# Patient Record
Sex: FEMALE | Race: WHITE | Employment: FULL TIME | URBAN - METROPOLITAN AREA
[De-identification: names, ages, dates, MRNs, and addresses within clinical notes are randomized per-mention and may not be internally consistent; named-entity substitution may affect disease eponyms.]

---

## 2019-02-11 LAB
ALT SERPL-CCNC: 19 IU/L (ref 8–45)
AST SERPL-CCNC: 19 IU/L (ref 2–40)
CHOLEST SERPL-MCNC: 266 MG/DL (ref 100–199)
CREAT SERPL-MCNC: 0.8 MG/DL (ref 0.57–1.11)
GFR SERPL CREATININE-BSD FRML MDRD: >60 ML/MIN/1.73M2
GLUCOSE SERPL-MCNC: 183 MG/DL (ref 65–100)
HBA1C MFR BLD: 7.6 % (ref 0–5.7)
HDLC SERPL-MCNC: 46 MG/DL
LDLC SERPL CALC-MCNC: 178 MG/DL
NONHDLC SERPL-MCNC: 220 MG/DL
POTASSIUM SERPL-SCNC: 3.8 MMOL/L (ref 3.5–5)
TRIGL SERPL-MCNC: 208 MG/DL

## 2019-03-21 ENCOUNTER — TRANSFERRED RECORDS (OUTPATIENT)
Dept: HEALTH INFORMATION MANAGEMENT | Facility: CLINIC | Age: 64
End: 2019-03-21

## 2019-05-28 ENCOUNTER — TRANSFERRED RECORDS (OUTPATIENT)
Dept: HEALTH INFORMATION MANAGEMENT | Facility: CLINIC | Age: 64
End: 2019-05-28

## 2019-05-29 LAB
CREAT SERPL-MCNC: 1.33 MG/DL (ref 0.57–1.11)
GFR SERPL CREATININE-BSD FRML MDRD: 40 ML/MIN/1.73M2
GLUCOSE SERPL-MCNC: 167 MG/DL (ref 65–100)
POTASSIUM SERPL-SCNC: 4.2 MMOL/L (ref 3.5–5)

## 2019-06-04 ENCOUNTER — TRANSFERRED RECORDS (OUTPATIENT)
Dept: HEALTH INFORMATION MANAGEMENT | Facility: CLINIC | Age: 64
End: 2019-06-04

## 2019-06-04 LAB
ALT SERPL-CCNC: 8 IU/L (ref 8–45)
AST SERPL-CCNC: 16 IU/L (ref 2–40)
CREAT SERPL-MCNC: 1.16 MG/DL (ref 0.57–1.11)
GFR SERPL CREATININE-BSD FRML MDRD: 47 ML/MIN/1.73M2
GLUCOSE SERPL-MCNC: 179 MG/DL (ref 65–100)
POTASSIUM SERPL-SCNC: 4.7 MMOL/L (ref 3.5–5)

## 2019-07-15 NOTE — PROGRESS NOTES
Subjective     Sheeba Morris is a 63 year old female who presents to clinic today for the following health issues:    HPI     PAP per pt had a hysterectomy.  Pap no longer indicated.    NEW PATIENT changed insurances:    Tried 5 statins and got really bad aches. Added to allergy list.     Recent kidney cancer--following Dr. Walker urologist currently.      Surgically removed.  No chemo or radiation done.  Gets MRI every 6 months.      Patient is morbidly obese.  Has seen dietician.         Last a1c was 7.6. In February.  Had kidney function done last month and she has mild kidney disease stage 2.  She did also just have a kidney removed.       Diabetes Follow-up      How often are you checking your blood sugar? One time daily    What time of day are you checking your blood sugars (select all that apply)?  Before meals    Have you had any blood sugars above 200?  No    Have you had any blood sugars below 70?  Yes 50    What symptoms do you notice when your blood sugar is low?  Shaky, Dizzy, Weak, Lethargy and Confusion    What concerns do you have today about your diabetes? Other: Go over meds      Do you have any of these symptoms? (Select all that apply)  No numbness or tingling in feet.  No redness, sores or blisters on feet.  No complaints of excessive thirst.  No reports of blurry vision.  No significant changes to weight.     Have you had a diabetic eye exam in the last 12 months? Yes- Date of last eye exam: 04/01/2019    Diabetes Management Resources    Patient has been taking insulin, metformin, and a sulfonylurea.  No side effects.  Her sugars have been generally in the 100s.  She declines an A1c today because she states she does not think her insurance will cover it and she is used to getting them every 6 months.  She will return next month for this.          Hypertension Follow-up      Do you check your blood pressure regularly outside of the clinic? Yes     Are you following a low salt diet? Yes    Are  your blood pressures ever more than 140 on the top number (systolic) OR more   than 90 on the bottom number (diastolic), for example 140/90? No      No chest pain, shortness of breath, edema, PND, or orthopnea. No dizziness or vision changes. No side effects from medications. Blood pressure has been stable on medication.    BP Readings from Last 2 Encounters:   No data found for BP     Hemoglobin A1C (%)   Date Value   02/11/2019 7.6 (H)     LDL Cholesterol Calculated (mg/dL)   Date Value   02/11/2019 178 (H)       Amount of exercise or physical activity: None    Problems taking medications regularly: No    Medication side effects: none    Diet: low salt and low fat/cholesterol            Nonsmoker.     Previously followed with endocrine for diabetes then family practice. Wants to establish here.  Her friend recommended she see me.    States she had blood in her urine last week.  This resolved but she would like a UA done today.  If it is positive she will need to follow-up again with her urologist.  Patient Active Problem List   Diagnosis     Advanced directives, counseling/discussion     Morbid obesity (H)     History of kidney cancer     Type 2 diabetes mellitus without complication, with long-term current use of insulin (H)     Benign essential hypertension     Past Surgical History:   Procedure Laterality Date     BREAST SURGERY      cancer     HYSTERECTOMY      cancer     HYSTERECTOMY TOTAL ABDOMINAL       KIDNEY SURGERY      cancer       Social History     Tobacco Use     Smoking status: Never Smoker     Smokeless tobacco: Never Used   Substance Use Topics     Alcohol use: Not Currently     Frequency: Never     Family History   Problem Relation Age of Onset     Hypertension Mother      Diabetes Mother      Hyperlipidemia Mother      Hypertension Father      Diabetes Father      Hyperlipidemia Father          Current Outpatient Medications   Medication Sig Dispense Refill     diltiazem ER COATED BEADS  "(CARDIZEM CD/CARTIA XT) 180 MG 24 hr capsule Take 1 capsule (180 mg) by mouth daily 90 capsule 0     glimepiride (AMARYL) 4 MG tablet Take 1 tablet (4 mg) by mouth 2 times daily 180 tablet 0     insulin glargine (BASAGLAR KWIKPEN) 100 UNIT/ML pen Inject 45 Units Subcutaneous At Bedtime 3 months supply please 41 mL 0     metFORMIN (GLUCOPHAGE-XR) 500 MG 24 hr tablet Take 2 tablets (1,000 mg) by mouth 2 times daily (with meals) 360 tablet 0     STATIN NOT PRESCRIBED (INTENTIONAL) Please choose reason not prescribed, below       valsartan-hydrochlorothiazide (DIOVAN HCT) 160-25 MG tablet Take 1 tablet by mouth daily 90 tablet 0     Allergies   Allergen Reactions     Hmg-Coa-R Inhibitors Muscle Pain (Myalgia)     Severe muscle pain and aches     Penicillins      12-            Dermat.-Resp. problems-         Reviewed and updated as needed this visit by Provider         Review of Systems   ROS COMP: Constitutional, HEENT, cardiovascular, pulmonary, GI, , musculoskeletal, neuro, skin, endocrine and psych systems are negative, except as otherwise noted.      Objective    Pulse 94   Temp 98.8  F (37.1  C) (Oral)   Resp 16   Ht 1.613 m (5' 3.5\")   Wt 130.2 kg (287 lb)   LMP  (Exact Date)   SpO2 95%   Breastfeeding? No   BMI 50.04 kg/m    Body mass index is 50.04 kg/m .  Physical Exam   GENERAL: alert, no distress and obese  RESP: lungs clear to auscultation - no rales, rhonchi or wheezes  CV: regular rate and rhythm, normal S1 S2, no S3 or S4, no murmur, click or rub, no peripheral edema and peripheral pulses strong  MS: no gross musculoskeletal defects noted, no edema  NEURO: Normal strength and tone, mentation intact and speech normal  PSYCH: mentation appears normal, affect normal/bright    ua-in progress        Assessment & Plan     1. Type 2 diabetes mellitus without complication, with long-term current use of insulin (H)  Need labs see below    - diltiazem ER COATED BEADS (CARDIZEM CD/CARTIA XT) 180 MG " "24 hr capsule; Take 1 capsule (180 mg) by mouth daily  Dispense: 90 capsule; Refill: 0  - glimepiride (AMARYL) 4 MG tablet; Take 1 tablet (4 mg) by mouth 2 times daily  Dispense: 180 tablet; Refill: 0  - insulin glargine (BASAGLAR KWIKPEN) 100 UNIT/ML pen; Inject 45 Units Subcutaneous At Bedtime 3 months supply please  Dispense: 41 mL; Refill: 0  - metFORMIN (GLUCOPHAGE-XR) 500 MG 24 hr tablet; Take 2 tablets (1,000 mg) by mouth 2 times daily (with meals)  Dispense: 360 tablet; Refill: 0  - valsartan-hydrochlorothiazide (DIOVAN HCT) 160-25 MG tablet; Take 1 tablet by mouth daily  Dispense: 90 tablet; Refill: 0  - Hemoglobin A1c; Future  - Basic metabolic panel; Future  - STATIN NOT PRESCRIBED (INTENTIONAL); Please choose reason not prescribed, below    2. Screen for colon cancer    Not addressed today  3. Morbid obesity (H)    Needs to work on eating habits  4. Screening for hyperlipidemia      5. Advanced directives, counseling/discussion      6. History of kidney cancer  Continue with urology    7. Benign essential hypertension      8. Hematuria, unspecified type  Waiting on labs    - UA reflex to Microscopic     BMI:   Estimated body mass index is 50.04 kg/m  as calculated from the following:    Height as of this encounter: 1.613 m (5' 3.5\").    Weight as of this encounter: 130.2 kg (287 lb).   Weight management plan: Discussed healthy diet and exercise guidelines      Recheck 1 month after a1c is done      Return in about 4 weeks (around 8/27/2019) for Lab Work.    Maria Guadalupe Martin PA-C  [facility]        "

## 2019-07-30 ENCOUNTER — OFFICE VISIT (OUTPATIENT)
Dept: FAMILY MEDICINE | Facility: CLINIC | Age: 64
End: 2019-07-30
Payer: COMMERCIAL

## 2019-07-30 VITALS
BODY MASS INDEX: 49 KG/M2 | HEART RATE: 94 BPM | TEMPERATURE: 98.8 F | WEIGHT: 287 LBS | SYSTOLIC BLOOD PRESSURE: 130 MMHG | DIASTOLIC BLOOD PRESSURE: 70 MMHG | RESPIRATION RATE: 16 BRPM | HEIGHT: 64 IN | OXYGEN SATURATION: 95 %

## 2019-07-30 DIAGNOSIS — E66.01 MORBID OBESITY (H): ICD-10-CM

## 2019-07-30 DIAGNOSIS — Z85.528 HISTORY OF KIDNEY CANCER: ICD-10-CM

## 2019-07-30 DIAGNOSIS — Z12.11 SCREEN FOR COLON CANCER: ICD-10-CM

## 2019-07-30 DIAGNOSIS — Z79.4 TYPE 2 DIABETES MELLITUS WITHOUT COMPLICATION, WITH LONG-TERM CURRENT USE OF INSULIN (H): Primary | ICD-10-CM

## 2019-07-30 DIAGNOSIS — R31.9 HEMATURIA, UNSPECIFIED TYPE: ICD-10-CM

## 2019-07-30 DIAGNOSIS — Z13.220 SCREENING FOR HYPERLIPIDEMIA: ICD-10-CM

## 2019-07-30 DIAGNOSIS — E11.9 TYPE 2 DIABETES MELLITUS WITHOUT COMPLICATION, WITH LONG-TERM CURRENT USE OF INSULIN (H): Primary | ICD-10-CM

## 2019-07-30 DIAGNOSIS — I10 BENIGN ESSENTIAL HYPERTENSION: ICD-10-CM

## 2019-07-30 DIAGNOSIS — Z71.89 ADVANCED DIRECTIVES, COUNSELING/DISCUSSION: ICD-10-CM

## 2019-07-30 LAB
ALBUMIN UR-MCNC: NEGATIVE MG/DL
APPEARANCE UR: CLEAR
BACTERIA #/AREA URNS HPF: ABNORMAL /HPF
BILIRUB UR QL STRIP: NEGATIVE
COLOR UR AUTO: YELLOW
GLUCOSE UR STRIP-MCNC: NEGATIVE MG/DL
HGB UR QL STRIP: ABNORMAL
KETONES UR STRIP-MCNC: NEGATIVE MG/DL
LEUKOCYTE ESTERASE UR QL STRIP: NEGATIVE
NITRATE UR QL: NEGATIVE
NON-SQ EPI CELLS #/AREA URNS LPF: ABNORMAL /LPF
PH UR STRIP: 5 PH (ref 5–7)
RBC #/AREA URNS AUTO: ABNORMAL /HPF
SOURCE: ABNORMAL
SP GR UR STRIP: >1.03 (ref 1–1.03)
UROBILINOGEN UR STRIP-ACNC: 0.2 EU/DL (ref 0.2–1)
WBC #/AREA URNS AUTO: ABNORMAL /HPF

## 2019-07-30 PROCEDURE — 87086 URINE CULTURE/COLONY COUNT: CPT | Performed by: PHYSICIAN ASSISTANT

## 2019-07-30 PROCEDURE — 99214 OFFICE O/P EST MOD 30 MIN: CPT | Performed by: PHYSICIAN ASSISTANT

## 2019-07-30 PROCEDURE — 81001 URINALYSIS AUTO W/SCOPE: CPT | Performed by: PHYSICIAN ASSISTANT

## 2019-07-30 PROCEDURE — 87186 SC STD MICRODIL/AGAR DIL: CPT | Performed by: PHYSICIAN ASSISTANT

## 2019-07-30 PROCEDURE — 87088 URINE BACTERIA CULTURE: CPT | Performed by: PHYSICIAN ASSISTANT

## 2019-07-30 RX ORDER — GLIMEPIRIDE 4 MG/1
4 TABLET ORAL 2 TIMES DAILY
Qty: 180 TABLET | Refills: 0 | Status: SHIPPED | OUTPATIENT
Start: 2019-07-30 | End: 2019-08-27 | Stop reason: ALTCHOICE

## 2019-07-30 RX ORDER — INSULIN GLARGINE 100 [IU]/ML
45 INJECTION, SOLUTION SUBCUTANEOUS
COMMUNITY
Start: 2019-02-06 | End: 2019-07-30

## 2019-07-30 RX ORDER — DILTIAZEM HYDROCHLORIDE 180 MG/1
180 CAPSULE, COATED, EXTENDED RELEASE ORAL DAILY
Qty: 90 CAPSULE | Refills: 0 | Status: SHIPPED | OUTPATIENT
Start: 2019-07-30 | End: 2019-10-21

## 2019-07-30 RX ORDER — VALSARTAN AND HYDROCHLOROTHIAZIDE 160; 25 MG/1; MG/1
1 TABLET ORAL DAILY
Qty: 90 TABLET | Refills: 0 | Status: SHIPPED | OUTPATIENT
Start: 2019-07-30 | End: 2019-12-05

## 2019-07-30 RX ORDER — DILTIAZEM HYDROCHLORIDE 180 MG/1
180 CAPSULE, COATED, EXTENDED RELEASE ORAL
COMMUNITY
Start: 2019-04-17 | End: 2019-07-30

## 2019-07-30 RX ORDER — METFORMIN HCL 500 MG
1000 TABLET, EXTENDED RELEASE 24 HR ORAL
COMMUNITY
Start: 2019-07-14 | End: 2019-07-30

## 2019-07-30 RX ORDER — GLIMEPIRIDE 4 MG/1
TABLET ORAL
COMMUNITY
Start: 2019-07-17 | End: 2019-07-30

## 2019-07-30 RX ORDER — VALSARTAN AND HYDROCHLOROTHIAZIDE 160; 25 MG/1; MG/1
1 TABLET ORAL DAILY
Refills: 3 | COMMUNITY
Start: 2019-05-20 | End: 2019-07-30

## 2019-07-30 RX ORDER — METFORMIN HCL 500 MG
1000 TABLET, EXTENDED RELEASE 24 HR ORAL 2 TIMES DAILY WITH MEALS
Qty: 360 TABLET | Refills: 0 | Status: SHIPPED | OUTPATIENT
Start: 2019-07-30 | End: 2019-12-09

## 2019-07-30 RX ORDER — INSULIN GLARGINE 100 [IU]/ML
45 INJECTION, SOLUTION SUBCUTANEOUS AT BEDTIME
Qty: 41 ML | Refills: 0 | Status: SHIPPED | OUTPATIENT
Start: 2019-07-30 | End: 2019-09-23

## 2019-07-30 SDOH — HEALTH STABILITY: MENTAL HEALTH: HOW OFTEN DO YOU HAVE A DRINK CONTAINING ALCOHOL?: NEVER

## 2019-07-30 ASSESSMENT — MIFFLIN-ST. JEOR: SCORE: 1833.88

## 2019-07-31 ENCOUNTER — TELEPHONE (OUTPATIENT)
Dept: FAMILY MEDICINE | Facility: CLINIC | Age: 64
End: 2019-07-31

## 2019-07-31 RX ORDER — CIPROFLOXACIN 500 MG/1
500 TABLET, FILM COATED ORAL 2 TIMES DAILY
Qty: 14 TABLET | Refills: 0 | Status: SHIPPED | OUTPATIENT
Start: 2019-07-31 | End: 2019-10-21

## 2019-07-31 NOTE — RESULT ENCOUNTER NOTE
PLEASE CALL PATIENT: Dear Sheeba,      It was a pleasure to see you at your recent office visit.  Your test results are listed below.  Urine is positive culture growing e coli. Please start antibiotic sent to pharmacy for ou.       If you have any questions or concerns, please call the clinic at [phone].    Sincerely,  Maria Guadalupe Martin PA-C

## 2019-07-31 NOTE — TELEPHONE ENCOUNTER
PLEASE CALL PATIENT: Dear Sheeba,       It was a pleasure to see you at your recent office visit.  Your test results are listed below.  Urine is positive culture growing e coli. Please start antibiotic sent to pharmacy for ou.       If you have any questions or concerns, please call the clinic at [phone].     Sincerely,   Maria Guadalupe Martin PA-C

## 2019-07-31 NOTE — RESULT ENCOUNTER NOTE
PLEASE CALL PATIENT: Dear Sheeba,      It was a pleasure to see you at your recent office visit.  Your test results are listed below.  URINE SO far looks negative. No blood seen under microscope. I will culture to rule out infection but looks negative.         If you have any questions or concerns, please call the clinic at [phone].    Sincerely,  Maria Guadalupe Martin PA-C

## 2019-07-31 NOTE — TELEPHONE ENCOUNTER
PLEASE CALL PATIENT: Dear Sheeba,       It was a pleasure to see you at your recent office visit.  Your test results are listed below.  URINE SO far looks negative. No blood seen under microscope. I will culture to rule out infection but looks negative.         If you have any questions or concerns, please call the clinic at [phone].     Sincerely,   Maria Guadalupe Martin PA-C

## 2019-07-31 NOTE — TELEPHONE ENCOUNTER
Left message on answering machine for patient to call back to [phone].  Caty Gee BSN, RN

## 2019-08-01 LAB
BACTERIA SPEC CULT: ABNORMAL
SPECIMEN SOURCE: ABNORMAL

## 2019-08-13 DIAGNOSIS — R79.0 ABNORMAL LEVEL OF BLOOD MINERAL: Primary | ICD-10-CM

## 2019-08-13 DIAGNOSIS — Z79.4 TYPE 2 DIABETES MELLITUS WITHOUT COMPLICATION, WITH LONG-TERM CURRENT USE OF INSULIN (H): ICD-10-CM

## 2019-08-13 DIAGNOSIS — E11.9 TYPE 2 DIABETES MELLITUS WITHOUT COMPLICATION, WITH LONG-TERM CURRENT USE OF INSULIN (H): ICD-10-CM

## 2019-08-13 LAB
ANION GAP SERPL CALCULATED.3IONS-SCNC: 7 MMOL/L (ref 3–14)
BUN SERPL-MCNC: 22 MG/DL (ref 7–30)
CALCIUM SERPL-MCNC: 9.9 MG/DL (ref 8.5–10.1)
CHLORIDE SERPL-SCNC: 101 MMOL/L (ref 94–109)
CO2 SERPL-SCNC: 29 MMOL/L (ref 20–32)
CREAT SERPL-MCNC: 0.95 MG/DL (ref 0.52–1.04)
FERRITIN SERPL-MCNC: 60 NG/ML (ref 8–252)
GFR SERPL CREATININE-BSD FRML MDRD: 64 ML/MIN/{1.73_M2}
GLUCOSE SERPL-MCNC: 151 MG/DL (ref 70–99)
HBA1C MFR BLD: 8.3 % (ref 0–5.6)
POTASSIUM SERPL-SCNC: 4.3 MMOL/L (ref 3.4–5.3)
SODIUM SERPL-SCNC: 137 MMOL/L (ref 133–144)

## 2019-08-13 PROCEDURE — 80048 BASIC METABOLIC PNL TOTAL CA: CPT | Performed by: PHYSICIAN ASSISTANT

## 2019-08-13 PROCEDURE — 82728 ASSAY OF FERRITIN: CPT | Performed by: INTERNAL MEDICINE

## 2019-08-13 PROCEDURE — 83036 HEMOGLOBIN GLYCOSYLATED A1C: CPT | Performed by: PHYSICIAN ASSISTANT

## 2019-08-13 PROCEDURE — 36415 COLL VENOUS BLD VENIPUNCTURE: CPT | Performed by: PHYSICIAN ASSISTANT

## 2019-08-14 ENCOUNTER — TELEPHONE (OUTPATIENT)
Dept: FAMILY MEDICINE | Facility: CLINIC | Age: 64
End: 2019-08-14

## 2019-08-14 NOTE — TELEPHONE ENCOUNTER
Left message on answering machine for patient to call back to [phone].  Caty Gee BSN, RN

## 2019-08-14 NOTE — TELEPHONE ENCOUNTER
PLEASE CALL PATIENT: Dear Sheeba,       It was a pleasure to see you at your recent office visit.  Your test results are listed below.  BLOOd sugars have worsened a1c is well above goal at 8.3. Please schedule with diabetic educator (referral placed) asap. They will find the next medication addition best for you and your needs and insurance coverage. Then see me again in 3 months for a1c also and diabetic check up. Kidney function was normal.         If you have any questions or concerns, please call the clinic at [phone].     Sincerely,   Maria Guadalupe Martin PA-C

## 2019-08-14 NOTE — RESULT ENCOUNTER NOTE
PLEASE CALL PATIENT: Dear Sheeba,      It was a pleasure to see you at your recent office visit.  Your test results are listed below.  BLOOd sugars have worsened a1c is well above goal at 8.3. Please schedule with diabetic educator (referral placed) asap. They will find the next medication addition best for you and your needs and insurance coverage. Then see me again in 3 months for a1c also and diabetic check up. Kidney function was normal.         If you have any questions or concerns, please call the clinic at [phone].    Sincerely,  Maria Guadalupe Martin PA-C

## 2019-08-15 ENCOUNTER — TELEPHONE (OUTPATIENT)
Dept: FAMILY MEDICINE | Facility: CLINIC | Age: 64
End: 2019-08-15

## 2019-08-15 NOTE — TELEPHONE ENCOUNTER
Diabetes Education Scheduling Outreach #1:    Call to patient to schedule. Left message with phone number to call to schedule.    Plan for 2nd outreach attempt within 1 week.    Caty Dahl  [facility]  Diabetes and Nutrition Scheduling

## 2019-08-16 NOTE — TELEPHONE ENCOUNTER
Pt vm is full, unable to leave message.  Unable to reach pt by phone.  Do you want a letter sent? Certified?  Caty ALMARAZN, RN

## 2019-08-16 NOTE — TELEPHONE ENCOUNTER
Pt notified of provider message as written.  Pt verbalized good understanding.  Caty Gee BSN, RN

## 2019-08-27 ENCOUNTER — ALLIED HEALTH/NURSE VISIT (OUTPATIENT)
Dept: EDUCATION SERVICES | Facility: CLINIC | Age: 64
End: 2019-08-27
Payer: COMMERCIAL

## 2019-08-27 DIAGNOSIS — E11.9 DIABETES MELLITUS WITHOUT COMPLICATION (H): ICD-10-CM

## 2019-08-27 DIAGNOSIS — E11.9 TYPE 2 DIABETES MELLITUS WITHOUT COMPLICATION, WITH LONG-TERM CURRENT USE OF INSULIN (H): Primary | ICD-10-CM

## 2019-08-27 DIAGNOSIS — Z79.4 TYPE 2 DIABETES MELLITUS WITHOUT COMPLICATION, WITH LONG-TERM CURRENT USE OF INSULIN (H): Primary | ICD-10-CM

## 2019-08-27 PROCEDURE — 99207 ZZC DROP WITH A PROCEDURE: CPT

## 2019-08-27 PROCEDURE — G0108 DIAB MANAGE TRN  PER INDIV: HCPCS

## 2019-08-27 RX ORDER — FLASH GLUCOSE SCANNING READER
1 EACH MISCELLANEOUS CONTINUOUS
Qty: 1 DEVICE | Refills: 0 | Status: SHIPPED | OUTPATIENT
Start: 2019-08-27

## 2019-08-27 RX ORDER — LANCETS
EACH MISCELLANEOUS
Qty: 102 EACH | Refills: 11 | Status: SHIPPED | OUTPATIENT
Start: 2019-08-27

## 2019-08-27 RX ORDER — FLASH GLUCOSE SENSOR
1 KIT MISCELLANEOUS CONTINUOUS
Qty: 6 EACH | Refills: 11 | Status: SHIPPED | OUTPATIENT
Start: 2019-08-27

## 2019-08-27 NOTE — PROGRESS NOTES
"Diabetes Self-Management Education & Support    Diabetes Education Self Management & Training    SUBJECTIVE/OBJECTIVE:  Presents for: Individual review  Accompanied by: Self  Diabetes education in the past 24mo: Yes  Focus of Visit: Monitoring, Taking Medication, Being Active, Diabetes Pathophysiology, Self-care behavioral goal setting  Diabetes type: Type 2  Disease course: Worsening  Diabetes management related comments/concerns: managing meals,   Transportation concerns: No  Other concerns:: None  Cultural Influences/Ethnic Background:  American      Diabetes Symptoms & Complications  Blurred vision: No  Fatigue: Yes  Neuropathy: No  Foot ulcerations: No  Polydipsia: No  Polyphagia: Yes  Polyuria: No  Visual change: No  Weakness: No  Weight loss: No  Slow healing wounds: No  Recent Infection(s): No  Other: Yes(recent surgery had kidney removed)  Symptom course: Worsening  Weight trend: Stable  Autonomic neuropathy: No  CVA: No  Heart disease: No  Nephropathy: Yes  Peripheral neuropathy: No  Peripheral Vascular Disease: No  Retinopathy: No    Patient Problem List and Family Medical History reviewed for relevant medical history, current medical status, and diabetes risk factors.    Vitals:  There were no vitals taken for this visit.  Estimated body mass index is 50.04 kg/m  as calculated from the following:    Height as of 7/30/19: 1.613 m (5' 3.5\").    Weight as of 7/30/19: 130.2 kg (287 lb).   Last 3 BP:   BP Readings from Last 3 Encounters:   07/30/19 130/70       History   Smoking Status     Never Smoker   Smokeless Tobacco     Never Used       Labs:  Lab Results   Component Value Date    A1C 8.3 08/13/2019     Lab Results   Component Value Date     08/13/2019     Lab Results   Component Value Date     02/11/2019     HDL Cholesterol   Date Value Ref Range Status   02/11/2019 46 >40 mg/dL Final   ]  GFR Estimate   Date Value Ref Range Status   08/13/2019 64 >60 mL/min/[1.73_m2] Final     Comment: "     Non  GFR Calc  Starting 12/18/2018, serum creatinine based estimated GFR (eGFR) will be   calculated using the Chronic Kidney Disease Epidemiology Collaboration   (CKD-EPI) equation.       GFR Estimate If Black   Date Value Ref Range Status   08/13/2019 74 >60 mL/min/[1.73_m2] Final     Comment:      GFR Calc  Starting 12/18/2018, serum creatinine based estimated GFR (eGFR) will be   calculated using the Chronic Kidney Disease Epidemiology Collaboration   (CKD-EPI) equation.       Lab Results   Component Value Date    CR 0.95 08/13/2019     No results found for: MICROALBUMIN    Healthy Eating  Healthy Eating Assessed Today: Yes  Cultural/Latter-day diet restrictions?: No  Meal planning: Smaller portions  Meals include: Lunch, Dinner, Snacks  Breakfast: gets up 5-8 am , skips, or may eat a bagel or english muffin or brittani shanika breakfast sandwich or egg and english muffin  Lunch: @ 1330, turkey and cheese sandwich and veggies and milk or wild rice soup and 1/2 turkey   Dinner: @ 6-7:00, Steak and hash browns, corn on the cob and veggies and milk or chicken wings diet coke or chicken breast and rice  Snacks: peach, bedtime, or snack of cherries and grapes  Beverages: Water, Coffee, Milk  Has patient met with a dietitian in the past?: No    Being Active  Being Active Assessed Today: Yes  Exercise:: Yes(starting to increase her walking)  How intense was your typical exercise? : Light (like stretching or slow walking)  Barrier to exercise: Other    Monitoring  Monitoring Assessed Today: Yes  Did patient bring glucose meter to appointment? : No  Blood Glucose Meter: Accu-check  Home Glucose (Sugar) Monitoring: 3-4 times per week  Blood glucose trend: Fluctuating minimally  Low Glucose Range (mg/dL): 70-90  High Glucose Range (mg/dL): 140-180  Overall Range (mg/dL): 130-140    Did not bring in her meter.      Taking Medications  Diabetes Medication(s)     Biguanides       metFORMIN  (GLUCOPHAGE-XR) 500 MG 24 hr tablet    Take 2 tablets (1,000 mg) by mouth 2 times daily (with meals)    Insulin       insulin glargine (BASAGLAR KWIKPEN) 100 UNIT/ML pen    Inject 45 Units Subcutaneous At Bedtime 3 months supply please    Incretin Mimetic Agents (GLP-1 Receptor Agonists)       Semaglutide (OZEMPIC) 0.25 or 0.5 MG/DOSE SOPN    Inject 0.5 mg Subcutaneous once a week          Taking Medication Assessed Today: Yes  Current Treatments: Insulin Injections, Oral Agent (dual therapy)(Metformin  mg takes 4 tabs daily, Glimepiride 4 mg bid and Basaglar 45 units daily)  Given by: Patient  Injection/Infusion sites: Abdomen  Problems taking diabetes medications regularly?: No  Diabetes medication side effects?: No  Treatment Compliance: All of the time    Problem Solving  Problem Solving Assessed Today: Yes  Hypoglycemia Frequency: Weekly  Hypoglycemia Treatment: Glucose (tablets or gel), Other food  Patient carries a carbohydrate source: No  Medical alert: No  Severe weather/disaster plan for diabetes management?: No  DKA prevention plan?: No  Sick day plan for diabetes management?: (P) No    Hypoglycemia symptoms  Confusion: Yes  Dizziness or Light-Headedness: Yes  Headaches: No  Hunger: No  Mood changes: No  Nervousness/Anxiety: Yes  Sleepiness: No  Speech difficulty: No  Sweats: No  Tremors: Yes    Hypoglycemia Complications  Blackouts: No  Hospitalization: No  Nocturnal hypoglycemia: No  Required assistance: No  Required glucagon injection: No  Seizures: No    Reducing Risks  CAD Risks: Family history, Hypertension, Obesity  Has dilated eye exam at least once a year?: Yes  Sees dentist every 6 months?: Yes  Sees podiatrist (foot doctor)?: No    Healthy Coping  Informal Support system:: Children, Family, Friends, Spouse  Difficulty affording diabetes management supplies?: No  Patient Activation Measure Survey Score:  CRISTIN Score (Last Two) 7/30/2019   CRISTIN Raw Score 40   Activation Score 100   CRISTIN Level 4        ASSESSMENT:  Sheeba comes today as a new patient to [facility].  She is seeking a new PCP and needing help to manage her diabetes.  She has been unhappy with adding more medications and not seeing better results in her A1C.  I did review with her meals and foods and medications and what each medication for diabetes does.  She reports she may be having some lows and may not really know for sure.  Reviewed hypo and hyper S&S.  She has also mentioned she has only 1 kidney due to cancer, and now she is bleeding from her bladder.  She will contact her urologist soon to report this.      Goals Addressed as of 8/27/2019 at 5:34 PM       Healthy Eating (pt-stated)     Added 8/27/19 by Marcelina Bone, RN     My Goal: I will follow the meal plan    What I need to meet my goal: 1. 3 meals per day, with proteins  2. 2-3 snacks each day with proteins and carbs    I plan to meet my goal by this date: 1 month          Medication 1 (pt-stated)     Added 8/27/19 by Marcelina Bone RN     My Goal: I will take the following meds    What I need to meet my goal: 1. Hold Metformin for a few days.   1. Slowly add Metformin 1 tabs for 2-3 days, then 2 tabs for a few days and 3 tabs for a few days and add the 4th if feeling ok. Taking in the evening meal.    2. Stop Glimepiride  3. Start Ozempic 0.25 mg weekly X 4 wks.    4. Week 5 increase to 0.5 mg weekly  5. Basaglar 45 units daily    I plan to meet my goal by this date: 1 month          Monitoring (pt-stated)     Added 8/27/19 by Marcelina Bone, RN     My Goal: I will monitor more frequently     What I need to meet my goal: 1. Look into the won sensor  2. Check when you wake up and 2 hrs after a meal    I plan to meet my goal by this date: 1 month            Patient's most recent   Lab Results   Component Value Date    A1C 8.3 08/13/2019    is not meeting goal of <7.0    INTERVENTION:   Diabetes knowledge and skills assessment:     Patient is knowledgeable in diabetes management  concepts related to: Monitoring    Patient needs further education on the following diabetes management concepts: Healthy Eating, Being Active, Monitoring, Taking Medication and Problem Solving    Based on learning assessment above, most appropriate setting for further diabetes education would be: Individual setting.    Education provided today on:  AADE Self-Care Behaviors:  Diabetes Pathophysiology  Healthy Eating: carbohydrate counting, consistency in amount, composition, and timing of food intake, weight reduction, heart healthy diet, eating out, portion control, plate planning method and label reading  Being Active: relationship to blood glucose and describe appropriate activity program  Monitoring: purpose, proper technique, log and interpret results, individual blood glucose targets and frequency of monitoring  Taking Medication: action of prescribed medication, drawing up, administering and storing injectable diabetes medications, proper site selection and rotation for injections, side effects of prescribed medications and when to take medications  Problem Solving: high blood glucose - causes, signs/symptoms, treatment and prevention, low blood glucose - causes, signs/symptoms, treatment and prevention, carrying a carbohydrate source at all times, safe travel and when to call health care provider  Patient was instructed on Accu-Chek Guide meter and was able to provide an accurate return demonstration. Patient's blood glucose reading today was  mg/dL.  GLP-1 administration technique taught today. Patient verbalized understanding and was able to perform an accurate return demonstration of administration technique. Side effects were discussed, if patient has any abdominal pain, with or without nausea and/or vomiting, stop medication, call provider, clinic or go to the emergency room.    Opportunities for ongoing education and support in diabetes-self management were discussed.    Pt verbalized understanding of  concepts discussed and recommendations provided today.       Education Materials Provided:  [facility] Understanding Diabetes Booklet, Carbohydrate Counting, Medication Information on Ozempic, Metformin insulin,and Glimepiride, My Plate Planner, Accu-Chek Guide meter kit and     PLAN:  See Patient Instructions for co-developed, patient-stated behavior change goals.  AVS printed and provided to patient today. See Follow-Up section for recommended follow-up.  Plan for Sheeba is to take her off Glimepiride since she is also on insulin.  May add later if she gets off of insulin.  GLP-1 for better control and weight loss. Hoping to get her off insulin and manage with Metformin and GLP-1    Monica Bone RN/MYA [place] Diabetes Educator    Time Spent: 60 minutes  Encounter Type: Individual    Any diabetes medication dose changes were made via the CDE Protocol and Collaborative Practice Agreement with the patient's primary care provider. A copy of this encounter was shared with the provider.

## 2019-08-27 NOTE — PATIENT INSTRUCTIONS
Diabetes Support Resources:  Check on the [place] sensor  Start Ozempic once you get it  Stop Glimepiride  Basaglar 45 units daily  Hold Metformin for a few days then slowly restart per your goal     Bring blood glucose meter and logbook with you to all doctor and follow-up appointments.    Diabetes Education Telephone Visit Follow-up:    We realize your time is valuable and your health is important! We offer a convenient Telephone Visit follow up! It s a quick way to check in for a medication dose adjustment without having to come back to clinic as soon.    Telephone Visits are often covered by insurance. Please check with your insurance plan to see if this type of visit is covered. If not, the cost is less expensive than an office visit:      Up to 10 minutes (Code 75370): $30    11-20 minutes (Code 35775): $59    More than 20 minutes (Code 45417): $85    Talk with your Diabetes Educator if you want to learn more.      [place] [facility]:  For Your Diabetes Education and Nutrition Appointments Call:  [phone]   For Diabetes Education or Nutrition Related Questions:   Phone: [phone]  E-mail: [email or web address]  Fax: [phone]   If you need a medication refill please contact your pharmacy. Please allow 3 business days for your refills to be completed.

## 2019-08-29 ENCOUNTER — MYC MEDICAL ADVICE (OUTPATIENT)
Dept: OTHER | Age: 64
End: 2019-08-29

## 2019-09-23 ENCOUNTER — ALLIED HEALTH/NURSE VISIT (OUTPATIENT)
Dept: NURSING | Facility: CLINIC | Age: 64
End: 2019-09-23
Payer: COMMERCIAL

## 2019-09-23 ENCOUNTER — ALLIED HEALTH/NURSE VISIT (OUTPATIENT)
Dept: EDUCATION SERVICES | Facility: CLINIC | Age: 64
End: 2019-09-23
Payer: COMMERCIAL

## 2019-09-23 DIAGNOSIS — E11.9 TYPE 2 DIABETES MELLITUS WITHOUT COMPLICATION, WITH LONG-TERM CURRENT USE OF INSULIN (H): Primary | ICD-10-CM

## 2019-09-23 DIAGNOSIS — Z79.4 TYPE 2 DIABETES MELLITUS WITHOUT COMPLICATION, WITH LONG-TERM CURRENT USE OF INSULIN (H): Primary | ICD-10-CM

## 2019-09-23 DIAGNOSIS — Z23 NEED FOR PROPHYLACTIC VACCINATION AND INOCULATION AGAINST INFLUENZA: Primary | ICD-10-CM

## 2019-09-23 DIAGNOSIS — E11.9 DIABETES MELLITUS WITHOUT COMPLICATION (H): ICD-10-CM

## 2019-09-23 PROCEDURE — 90471 IMMUNIZATION ADMIN: CPT

## 2019-09-23 PROCEDURE — 99207 ZZC DROP WITH A PROCEDURE: CPT

## 2019-09-23 PROCEDURE — G0108 DIAB MANAGE TRN  PER INDIV: HCPCS

## 2019-09-23 PROCEDURE — 90682 RIV4 VACC RECOMBINANT DNA IM: CPT

## 2019-09-23 RX ORDER — GLIMEPIRIDE 4 MG/1
TABLET ORAL
Qty: 90 TABLET | Refills: 1 | COMMUNITY
Start: 2019-09-23 | End: 2019-10-21

## 2019-09-23 NOTE — PROGRESS NOTES
"Diabetes Self-Management Education & Support    Diabetes Education Self Management & Training    SUBJECTIVE/OBJECTIVE:  Presents for: Initial Assessment for new diagnosis  Accompanied by: Self  Diabetes education in the past 24mo: Yes  Focus of Visit: Monitoring, Taking Medication, Being Active, Diabetes Pathophysiology, Self-care behavioral goal setting  Diabetes type: Type 2  Disease course: Worsening  Diabetes management related comments/concerns: managing meals,   Transportation concerns: No  Other concerns:: None  Cultural Influences/Ethnic Background:  American      Diabetes Symptoms & Complications  Blurred vision: No  Fatigue: Yes  Neuropathy: No  Foot ulcerations: No  Polydipsia: No  Polyphagia: Yes  Polyuria: No  Visual change: No  Weakness: No  Weight loss: No  Slow healing wounds: No  Recent Infection(s): No  Other: Yes(recent surgery had kidney removed)  Symptom course: Worsening  Weight trend: Stable  Autonomic neuropathy: No  CVA: No  Heart disease: No  Nephropathy: Yes  Peripheral neuropathy: No  Peripheral Vascular Disease: No  Retinopathy: No    Patient Problem List and Family Medical History reviewed for relevant medical history, current medical status, and diabetes risk factors.    Vitals:  There were no vitals taken for this visit.  Estimated body mass index is 50.04 kg/m  as calculated from the following:    Height as of 7/30/19: 1.613 m (5' 3.5\").    Weight as of 7/30/19: 130.2 kg (287 lb).   Last 3 BP:   BP Readings from Last 3 Encounters:   07/30/19 130/70       History   Smoking Status     Never Smoker   Smokeless Tobacco     Never Used       Labs:  Lab Results   Component Value Date    A1C 8.3 08/13/2019     Lab Results   Component Value Date     08/13/2019     Lab Results   Component Value Date     02/11/2019     HDL Cholesterol   Date Value Ref Range Status   02/11/2019 46 >40 mg/dL Final   ]  GFR Estimate   Date Value Ref Range Status   08/13/2019 64 >60 mL/min/[1.73_m2] " Final     Comment:     Non  GFR Calc  Starting 12/18/2018, serum creatinine based estimated GFR (eGFR) will be   calculated using the Chronic Kidney Disease Epidemiology Collaboration   (CKD-EPI) equation.       GFR Estimate If Black   Date Value Ref Range Status   08/13/2019 74 >60 mL/min/[1.73_m2] Final     Comment:      GFR Calc  Starting 12/18/2018, serum creatinine based estimated GFR (eGFR) will be   calculated using the Chronic Kidney Disease Epidemiology Collaboration   (CKD-EPI) equation.       Lab Results   Component Value Date    CR 0.95 08/13/2019     No results found for: MICROALBUMIN    Healthy Eating  Healthy Eating Assessed Today: Yes  Cultural/Islam diet restrictions?: No  Patient on a regular basis: Eats 3 meals a day  Meal planning: Smaller portions  Meals include: Lunch, Dinner, Snacks, Breakfast  Breakfast: gets up 5-8 am , skips, or may eat a bagel or english muffin or brittani shanika breakfast sandwich or egg and english muffin  Lunch: @ 1330, turkey and cheese sandwich and veggies and milk or wild rice soup and 1/2 turkey   Dinner: @ 6-7:00, Steak and hash browns, corn on the cob and veggies and milk or chicken wings diet coke or chicken breast and rice  Snacks: peach, bedtime, or snack of cherries and grapes  Beverages: Water, Coffee, Milk  Has patient met with a dietitian in the past?: No    Being Active  Being Active Assessed Today: Yes  Exercise:: Yes(starting to increase her walking)  How intense was your typical exercise? : Light (like stretching or slow walking)  Barrier to exercise: None    Monitoring  Monitoring Assessed Today: Yes  Did patient bring glucose meter to appointment? : No  Blood Glucose Meter: Accu-check  Home Glucose (Sugar) Monitoring: 3-4 times per day  Blood glucose trend: Fluctuating minimally  Low Glucose Range (mg/dL): 110-130  High Glucose Range (mg/dL): >200  Overall Range (mg/dL): >200        Taking Medications  Diabetes  Medication(s)     Biguanides       metFORMIN (GLUCOPHAGE-XR) 500 MG 24 hr tablet    Take 2 tablets (1,000 mg) by mouth 2 times daily (with meals)    Insulin       insulin degludec (TRESIBA) 200 UNIT/ML pen    Inject 50 Units Subcutaneous daily Order pen needles too    Sulfonylureas       glimepiride (AMARYL) 4 MG tablet    Take 1 tablet (4 mg) by mouth every morning (before breakfast) Take 1/2 tablet 30 minutes before breakfast and 30 minutes before dinner    Incretin Mimetic Agents (GLP-1 Receptor Agonists)       Semaglutide (OZEMPIC) 0.25 or 0.5 MG/DOSE SOPN    Inject 0.5 mg Subcutaneous once a week          Taking Medication Assessed Today: Yes  Current Treatments: Insulin Injections, Oral Agent (dual therapy)(Metformin  mg takes 4 tabs daily, Glimepiride 4 mg bid and Basaglar 45 units daily)  Given by: Patient  Injection/Infusion sites: Abdomen  Problems taking diabetes medications regularly?: No  Diabetes medication side effects?: No  Treatment Compliance: All of the time    Problem Solving  Problem Solving Assessed Today: Yes  Hypoglycemia Frequency: Weekly  Hypoglycemia Treatment: Glucose (tablets or gel), Other food  Patient carries a carbohydrate source: No  Medical alert: No  Severe weather/disaster plan for diabetes management?: No  DKA prevention plan?: No    Hypoglycemia symptoms  Confusion: Yes  Dizziness or Light-Headedness: Yes  Headaches: No  Hunger: No  Mood changes: No  Nervousness/Anxiety: Yes  Sleepiness: No  Speech difficulty: No  Sweats: No  Tremors: Yes    Hypoglycemia Complications  Blackouts: No  Hospitalization: No  Nocturnal hypoglycemia: No  Required assistance: No  Required glucagon injection: No  Seizures: No    Reducing Risks  CAD Risks: Family history, Hypertension, Obesity  Has dilated eye exam at least once a year?: Yes  Sees dentist every 6 months?: Yes  Sees podiatrist (foot doctor)?: No    Healthy Coping  Informal Support system:: Children, Family, Friends,  Spouse  Difficulty affording diabetes management supplies?: No  Patient Activation Measure Survey Score:  CRISTIN Score (Last Two) 7/30/2019   CRISTIN Raw Score 40   Activation Score 100   CRISTIN Level 4       ASSESSMENT:  Sheeba comes today for follow up.  Her BG are still too high.  She is still not established with a PCP here in [place].  I did mention that she needs to in the next few wks. In order to continue to be seen.  She is not exercising, I did encourage this strongly to manage her BG better.  Will change out her insulin to Tresiba from Basaglar.  Wanting to take her off of Glimepiride, and she will be increasing her Ozempic to 0.5 mg weekly and Metformin as well.  Will follow up in 4 wks.  Suggested she follow up with Endo as well.      Goals Addressed as of 9/23/2019 at 2:59 PM                 Today      Being Active (pt-stated)       Added 9/23/19 by Marcelina Bone, RN     My Goal: I will walk more and treadmill    What I need to meet my goal: 1. Will start with 2 days per week, for 5 minutes    I plan to meet my goal by this date: 1 month          Healthy Eating (pt-stated)   90%    Added 8/27/19 by Marcelina Bone, RN     My Goal: I will follow the meal plan    What I need to meet my goal: 1. 3 meals per day, with proteins  2. 2-3 snacks each day with proteins and carbs    I plan to meet my goal by this date: 1 month          Medication 1 (pt-stated)       Added 8/27/19 by Marcelina Bone, RN     My Goal: I will take the following meds    What I need to meet my goal: 1.   1. Slowly add Metformin 1 tabs for 2-3 days, then 2 tabs for a few days and 3 tabs for a few days and add the 4th if feeling ok. Taking in the evening meal.  Taking 1 pill with breakfast and 2 with dinner.  Will work on getting in 2 pills with breakfast  2.  Glimepiride 4 mg tabs, take 1/2 tablet 30 minutes before breakfast and 30 minutes before dinner  3. Start Ozempic 0.5 mg weekly    4. Stop Basaglar and start Tresiba U200 @ 50 units  daily    I plan to meet my goal by this date: 1 month            Patient's most recent   Lab Results   Component Value Date    A1C 8.3 08/13/2019    is not meeting goal of <7.0    INTERVENTION:   Diabetes knowledge and skills assessment:     Patient is knowledgeable in diabetes management concepts related to: Healthy Eating, Being Active, Monitoring and Taking Medication    Patient needs further education on the following diabetes management concepts: Healthy Eating, Being Active and Taking Medication    Based on learning assessment above, most appropriate setting for further diabetes education would be: Individual setting.    Education provided today on:  AADE Self-Care Behaviors:  Healthy Eating: carbohydrate counting, consistency in amount, composition, and timing of food intake, weight reduction, heart healthy diet, eating out, portion control, plate planning method and label reading  Being Active: relationship to blood glucose and describe appropriate activity program  Monitoring: purpose, proper technique, log and interpret results, individual blood glucose targets and frequency of monitoring  Taking Medication: action of prescribed medication, drawing up, administering and storing injectable diabetes medications, proper site selection and rotation for injections, side effects of prescribed medications and when to take medications    Opportunities for ongoing education and support in diabetes-self management were discussed.    Pt verbalized understanding of concepts discussed and recommendations provided today.       Education Materials Provided:  No new materials provided today    PLAN:  See Patient Instructions for co-developed, patient-stated behavior change goals.  AVS printed and provided to patient today. See Follow-Up section for recommended follow-up.     Will change out her insulin to Tresiba from Basaglar.  Wanting to take her off of Glimepiride, and she will be increasing her Ozempic to 0.5 mg  weekly and Metformin as well.  Will follow up in 4 wks.  Suggested she follow up with Endo as well.      Monica Bone RN/MYA  [facility] Educator    Time Spent: 60 minutes  Encounter Type: Individual    Any diabetes medication dose changes were made via the CDE Protocol and Collaborative Practice Agreement with the patient's primary care provider. A copy of this encounter was shared with the provider.

## 2019-09-23 NOTE — PATIENT INSTRUCTIONS
Diabetes Support Resources:  Establish care with a primary, suggest Dr. Acosta with [facility].      Dr. Mazariegos, Dr. Morales, Dr. Liu.      Think about our Endo in [facility], Dr. Mark Carmona  [phone]         Bring blood glucose meter and logbook with you to all doctor and follow-up appointments.    Diabetes Education Telephone Visit Follow-up:    We realize your time is valuable and your health is important! We offer a convenient Telephone Visit follow up! It s a quick way to check in for a medication dose adjustment without having to come back to clinic as soon.    Telephone Visits are often covered by insurance. Please check with your insurance plan to see if this type of visit is covered. If not, the cost is less expensive than an office visit:      Up to 10 minutes (Code 97516): $30    11-20 minutes (Code 17210): $59    More than 20 minutes (Code 47640): $85    Talk with your Diabetes Educator if you want to learn more.      [facility]:  For Your Diabetes Education and Nutrition Appointments Call:  [phone]   For Diabetes Education or Nutrition Related Questions:   Phone: [phone]  E-mail: [email or web address]  Fax: [phone]   If you need a medication refill please contact your pharmacy. Please allow 3 business days for your refills to be completed.

## 2019-09-23 NOTE — LETTER
"    9/23/2019         RE: Sheeba Morris  [address]  [place] [address]        Dear Colleague,    I am referring this patient to both of you, establish a PCP and Endo for her unmangaed diabetes.. Please see a copy of my visit note below.    Diabetes Self-Management Education & Support    Diabetes Education Self Management & Training    SUBJECTIVE/OBJECTIVE:  Presents for: Initial Assessment for new diagnosis  Accompanied by: Self  Diabetes education in the past 24mo: Yes  Focus of Visit: Monitoring, Taking Medication, Being Active, Diabetes Pathophysiology, Self-care behavioral goal setting  Diabetes type: Type 2  Disease course: Worsening  Diabetes management related comments/concerns: managing meals,   Transportation concerns: No  Other concerns:: None  Cultural Influences/Ethnic Background:  American      Diabetes Symptoms & Complications  Blurred vision: No  Fatigue: Yes  Neuropathy: No  Foot ulcerations: No  Polydipsia: No  Polyphagia: Yes  Polyuria: No  Visual change: No  Weakness: No  Weight loss: No  Slow healing wounds: No  Recent Infection(s): No  Other: Yes(recent surgery had kidney removed)  Symptom course: Worsening  Weight trend: Stable  Autonomic neuropathy: No  CVA: No  Heart disease: No  Nephropathy: Yes  Peripheral neuropathy: No  Peripheral Vascular Disease: No  Retinopathy: No    Patient Problem List and Family Medical History reviewed for relevant medical history, current medical status, and diabetes risk factors.    Vitals:  There were no vitals taken for this visit.  Estimated body mass index is 50.04 kg/m  as calculated from the following:    Height as of 7/30/19: 1.613 m (5' 3.5\").    Weight as of 7/30/19: 130.2 kg (287 lb).   Last 3 BP:   BP Readings from Last 3 Encounters:   07/30/19 130/70       History   Smoking Status     Never Smoker   Smokeless Tobacco     Never Used       Labs:  Lab Results   Component Value Date    A1C 8.3 08/13/2019     Lab Results   Component Value Date "     08/13/2019     Lab Results   Component Value Date     02/11/2019     HDL Cholesterol   Date Value Ref Range Status   02/11/2019 46 >40 mg/dL Final   ]  GFR Estimate   Date Value Ref Range Status   08/13/2019 64 >60 mL/min/[1.73_m2] Final     Comment:     Non  GFR Calc  Starting 12/18/2018, serum creatinine based estimated GFR (eGFR) will be   calculated using the Chronic Kidney Disease Epidemiology Collaboration   (CKD-EPI) equation.       GFR Estimate If Black   Date Value Ref Range Status   08/13/2019 74 >60 mL/min/[1.73_m2] Final     Comment:      GFR Calc  Starting 12/18/2018, serum creatinine based estimated GFR (eGFR) will be   calculated using the Chronic Kidney Disease Epidemiology Collaboration   (CKD-EPI) equation.       Lab Results   Component Value Date    CR 0.95 08/13/2019     No results found for: MICROALBUMIN    Healthy Eating  Healthy Eating Assessed Today: Yes  Cultural/Adventist diet restrictions?: No  Patient on a regular basis: Eats 3 meals a day  Meal planning: Smaller portions  Meals include: Lunch, Dinner, Snacks, Breakfast  Breakfast: gets up 5-8 am , skips, or may eat a bagel or english muffin or brittani shanika breakfast sandwich or egg and english muffin  Lunch: @ 1330, turkey and cheese sandwich and veggies and milk or wild rice soup and 1/2 turkey   Dinner: @ 6-7:00, Steak and hash browns, corn on the cob and veggies and milk or chicken wings diet coke or chicken breast and rice  Snacks: peach, bedtime, or snack of cherries and grapes  Beverages: Water, Coffee, Milk  Has patient met with a dietitian in the past?: No    Being Active  Being Active Assessed Today: Yes  Exercise:: Yes(starting to increase her walking)  How intense was your typical exercise? : Light (like stretching or slow walking)  Barrier to exercise: None    Monitoring  Monitoring Assessed Today: Yes  Did patient bring glucose meter to appointment? : No  Blood Glucose Meter:  Accu-check  Home Glucose (Sugar) Monitoring: 3-4 times per day  Blood glucose trend: Fluctuating minimally  Low Glucose Range (mg/dL): 110-130  High Glucose Range (mg/dL): >200  Overall Range (mg/dL): >200        Taking Medications  Diabetes Medication(s)     Biguanides       metFORMIN (GLUCOPHAGE-XR) 500 MG 24 hr tablet    Take 2 tablets (1,000 mg) by mouth 2 times daily (with meals)    Insulin       insulin degludec (TRESIBA) 200 UNIT/ML pen    Inject 50 Units Subcutaneous daily Order pen needles too    Sulfonylureas       glimepiride (AMARYL) 4 MG tablet    Take 1 tablet (4 mg) by mouth every morning (before breakfast) Take 1/2 tablet 30 minutes before breakfast and 30 minutes before dinner    Incretin Mimetic Agents (GLP-1 Receptor Agonists)       Semaglutide (OZEMPIC) 0.25 or 0.5 MG/DOSE SOPN    Inject 0.5 mg Subcutaneous once a week          Taking Medication Assessed Today: Yes  Current Treatments: Insulin Injections, Oral Agent (dual therapy)(Metformin  mg takes 4 tabs daily, Glimepiride 4 mg bid and Basaglar 45 units daily)  Given by: Patient  Injection/Infusion sites: Abdomen  Problems taking diabetes medications regularly?: No  Diabetes medication side effects?: No  Treatment Compliance: All of the time    Problem Solving  Problem Solving Assessed Today: Yes  Hypoglycemia Frequency: Weekly  Hypoglycemia Treatment: Glucose (tablets or gel), Other food  Patient carries a carbohydrate source: No  Medical alert: No  Severe weather/disaster plan for diabetes management?: No  DKA prevention plan?: No    Hypoglycemia symptoms  Confusion: Yes  Dizziness or Light-Headedness: Yes  Headaches: No  Hunger: No  Mood changes: No  Nervousness/Anxiety: Yes  Sleepiness: No  Speech difficulty: No  Sweats: No  Tremors: Yes    Hypoglycemia Complications  Blackouts: No  Hospitalization: No  Nocturnal hypoglycemia: No  Required assistance: No  Required glucagon injection: No  Seizures: No    Reducing Risks  CAD Risks:  Family history, Hypertension, Obesity  Has dilated eye exam at least once a year?: Yes  Sees dentist every 6 months?: Yes  Sees podiatrist (foot doctor)?: No    Healthy Coping  Informal Support system:: Children, Family, Friends, Spouse  Difficulty affording diabetes management supplies?: No  Patient Activation Measure Survey Score:  CRISTIN Score (Last Two) 7/30/2019   CRISTIN Raw Score 40   Activation Score 100   CRISTIN Level 4       ASSESSMENT:  Sheeba comes today for follow up.  Her BG are still too high.  She is still not established with a PCP here in [place].  I did mention that she needs to in the next few wks. In order to continue to be seen.  She is not exercising, I did encourage this strongly to manage her BG better.  Will change out her insulin to Tresiba from Basaglar.  Wanting to take her off of Glimepiride, and she will be increasing her Ozempic to 0.5 mg weekly and Metformin as well.  Will follow up in 4 wks.  Suggested she follow up with Endo as well.      Goals Addressed as of 9/23/2019 at 2:59 PM                 Today      Being Active (pt-stated)       Added 9/23/19 by Marcelina Bone, RN     My Goal: I will walk more and treadmill    What I need to meet my goal: 1. Will start with 2 days per week, for 5 minutes    I plan to meet my goal by this date: 1 month          Healthy Eating (pt-stated)   90%    Added 8/27/19 by Mareclina Bone, RN     My Goal: I will follow the meal plan    What I need to meet my goal: 1. 3 meals per day, with proteins  2. 2-3 snacks each day with proteins and carbs    I plan to meet my goal by this date: 1 month          Medication 1 (pt-stated)       Added 8/27/19 by Marcelina Bone, RN     My Goal: I will take the following meds    What I need to meet my goal: 1.   1. Slowly add Metformin 1 tabs for 2-3 days, then 2 tabs for a few days and 3 tabs for a few days and add the 4th if feeling ok. Taking in the evening meal.  Taking 1 pill with breakfast and 2 with dinner.  Will work on  getting in 2 pills with breakfast  2.  Glimepiride 4 mg tabs, take 1/2 tablet 30 minutes before breakfast and 30 minutes before dinner  3. Start Ozempic 0.5 mg weekly    4. Stop Basaglar and start Tresiba U200 @ 50 units daily    I plan to meet my goal by this date: 1 month            Patient's most recent   Lab Results   Component Value Date    A1C 8.3 08/13/2019    is not meeting goal of <7.0    INTERVENTION:   Diabetes knowledge and skills assessment:     Patient is knowledgeable in diabetes management concepts related to: Healthy Eating, Being Active, Monitoring and Taking Medication    Patient needs further education on the following diabetes management concepts: Healthy Eating, Being Active and Taking Medication    Based on learning assessment above, most appropriate setting for further diabetes education would be: Individual setting.    Education provided today on:  AADE Self-Care Behaviors:  Healthy Eating: carbohydrate counting, consistency in amount, composition, and timing of food intake, weight reduction, heart healthy diet, eating out, portion control, plate planning method and label reading  Being Active: relationship to blood glucose and describe appropriate activity program  Monitoring: purpose, proper technique, log and interpret results, individual blood glucose targets and frequency of monitoring  Taking Medication: action of prescribed medication, drawing up, administering and storing injectable diabetes medications, proper site selection and rotation for injections, side effects of prescribed medications and when to take medications    Opportunities for ongoing education and support in diabetes-self management were discussed.    Pt verbalized understanding of concepts discussed and recommendations provided today.       Education Materials Provided:  No new materials provided today    PLAN:  See Patient Instructions for co-developed, patient-stated behavior change goals.  AVS printed and provided  to patient today. See Follow-Up section for recommended follow-up.     Will change out her insulin to Tresiba from Basaglar.  Wanting to take her off of Glimepiride, and she will be increasing her Ozempic to 0.5 mg weekly and Metformin as well.  Will follow up in 4 wks.  Suggested she follow up with Endo as well.      Monica Bone RN/MYA  [facility] Diabetes Educator    Time Spent: 60 minutes  Encounter Type: Individual    Any diabetes medication dose changes were made via the CDE Protocol and Collaborative Practice Agreement with the patient's primary care provider. A copy of this encounter was shared with the provider.

## 2019-10-15 NOTE — PROGRESS NOTES
Subjective     Sheeba Morris is a 64 year old female who presents to clinic today for the following health issues:    History of Present Illness        Diabetes:   She presents for follow up of diabetes.  She is checking home blood glucose four or more times daily. She checks blood glucose before meals and after meals.  Blood glucose is sometimes over 200 and never under 70. She is aware of hypoglycemia symptoms including shakiness, dizziness and confusion. She is concerned about other. She is not experiencing numbness or burning in feet, excessive thirst, blurry vision, weight changes or redness, sores or blisters on feet. The patient has had a diabetic eye exam in the last 12 months. Eye exam performed on 4/2019.    Diabetes Management Resources    Hypertension: She presents for follow up of hypertension.  She does check blood pressure  regularly outside of the clinic. Outpatient blood pressures have not been over 140/90. She does not follow a low salt diet.     She eats 2-3 servings of fruits and vegetables daily.She consumes 0 sweetened beverage(s) daily.  She is taking medications regularly.       BP Readings from Last 2 Encounters:   10/21/19 138/85   07/30/19 130/70     Hemoglobin A1C (%)   Date Value   08/13/2019 8.3 (H)   02/11/2019 7.6 (H)     LDL Cholesterol Calculated (mg/dL)   Date Value   02/11/2019 178 (H)       Diabetes Management Resources      Weight:  Discussed diet and exercise.  Wt Readings from Last 4 Encounters:   10/21/19 130.2 kg (287 lb)   07/30/19 130.2 kg (287 lb)     Body mass index is 50.04 kg/m .          Patient Active Problem List   Diagnosis     Advanced directives, counseling/discussion     Morbid obesity (H)     History of kidney cancer     Type 2 diabetes mellitus without complication, with long-term current use of insulin (H)     Benign essential hypertension     Past Surgical History:   Procedure Laterality Date     BREAST SURGERY      cancer     COLONOSCOPY       ENT SURGERY   1981    Septoplasy & tonsilectomy     HYSTERECTOMY      cancer     HYSTERECTOMY TOTAL ABDOMINAL       KIDNEY SURGERY      cancer       Social History     Tobacco Use     Smoking status: Never Smoker     Smokeless tobacco: Never Used   Substance Use Topics     Alcohol use: Not Currently     Frequency: Never     Family History   Problem Relation Age of Onset     Hypertension Mother      Diabetes Mother      Hyperlipidemia Mother      Coronary Artery Disease Mother      Anesthesia Reaction Mother      Hypertension Father      Diabetes Father      Hyperlipidemia Father      Coronary Artery Disease Father      Other Cancer Father         Lung     Anesthesia Reaction Brother      Anesthesia Reaction Sister          Current Outpatient Medications   Medication Sig Dispense Refill     blood glucose (ACCU-CHEK GUIDE) test strip Use to test blood sugar 4 times daily or as directed. 4 Box 11     blood glucose monitoring (ACCU-CHEK FASTCLIX) lancets Use to test blood sugar 4 times daily or as directed.  Ok to substitute alternative if insurance prefers. 102 each 11     Continuous Blood Gluc  (FREESTYLE RUSTY 14 DAY READER) VENUS 1 Device continuous 1 Device 0     Continuous Blood Gluc Sensor (FREESTYLE RUSTY 14 DAY SENSOR) MISC 1 Box continuous 6 each 11     diltiazem ER COATED BEADS (CARDIZEM CD/CARTIA XT) 180 MG 24 hr capsule Take 1 capsule (180 mg) by mouth daily 90 capsule 0     glimepiride (AMARYL) 4 MG tablet Take 1/2 tablet 30 minutes before breakfast and 30 minutes before dinner 90 tablet 1     insulin degludec (TRESIBA) 200 UNIT/ML pen Inject 50 Units Subcutaneous daily Order pen needles too 18 mL 3     metFORMIN (GLUCOPHAGE-XR) 500 MG 24 hr tablet Take 2 tablets (1,000 mg) by mouth 2 times daily (with meals) 360 tablet 0     Semaglutide (OZEMPIC) 0.25 or 0.5 MG/DOSE SOPN Inject 0.5 mg Subcutaneous once a week 4.5 mL 3     STATIN NOT PRESCRIBED (INTENTIONAL) Please choose reason not prescribed, below        valsartan-hydrochlorothiazide (DIOVAN HCT) 160-25 MG tablet Take 1 tablet by mouth daily 90 tablet 0     Allergies   Allergen Reactions     Hmg-Coa-R Inhibitors Muscle Pain (Myalgia)     Severe muscle pain and aches     Penicillins      12-            Dermat.-Resp. problems-     Recent Labs   Lab Test 08/13/19  1031 06/04/19 02/11/19   A1C 8.3*  --   --  7.6*   LDL  --   --   --  178*   HDL  --   --   --  46   TRIG  --   --   --  208*   ALT  --  8 -- 19   CR 0.95 1.16*   < > 0.80   GFRESTIMATED 64 47*   < > >60   GFRESTBLACK 74 57*   < > >60   POTASSIUM 4.3 4.7   < > 3.8    < > = values in this interval not displayed.      BP Readings from Last 3 Encounters:   10/21/19 138/85   07/30/19 130/70    Wt Readings from Last 3 Encounters:   10/21/19 130.2 kg (287 lb)   07/30/19 130.2 kg (287 lb)                    Reviewed and updated as needed this visit by Provider  Tobacco  Allergies  Meds  Problems  Med Hx  Surg Hx  Fam Hx         Review of Systems         Objective    /85   Pulse 75   Temp 98.2  F (36.8  C) (Oral)   Wt 130.2 kg (287 lb)   SpO2 96%   Breastfeeding? No   BMI 50.04 kg/m    Body mass index is 50.04 kg/m .  Physical Exam               Assessment & Plan   Sheeba Morris is a 64 year old female who presents to clinic today for follow-up on chronic medical problems.  1. Type 2 diabetes mellitus without complication, with long-term current use of insulin (H)  History of diabetes mellitus type 2.  She is currently on Tresiba 50 units daily, Ozempic 1 mg weekly and metformin 1000 mg twice daily  Patient is compliant with medications.  Her last A1c is 8.3.  She is seeing Monica for diabetic occasion.  Diabetes not well controlled.  Discussed with patient cutting down on carbs and high glycemic index diet.  Will refer her for weight loss management program(Dr. Hills)    Lab Results   Component Value Date    A1C 8.3 08/13/2019    A1C 7.6 02/11/2019       - Albumin Random Urine Quantitative  "with Creat Ratio    2. Chronic right shoulder pain  Patient reports pain on the posterior aspect of the right shoulder.  She thinks she feels  a lump in this area.  Exam showed point tenderness on the posterior shoulder normal range of motion.  Will refer for ultrasound soft tissue.  - US Head Neck Soft Tissue; Future    3. Benign essential hypertension  She is currently on valsartan/hydrochlorothiazide.  Patient is compliant with her medication.  No side effects.  Initial blood pressure was 159/74, repeat 138/85.  Blood pressure is well controlled continue the current medication.    4. Class 3 severe obesity without serious comorbidity with body mass index (BMI) of 50.0 to 59.9 in adult, unspecified obesity type (H)    Wt Readings from Last 4 Encounters:   10/21/19 130.2 kg (287 lb)   07/30/19 130.2 kg (287 lb)     Body mass index is 50.04 kg/m .    - TSH WITH FREE T4 REFLEX    5. Encounter for HCV screening test for low risk patient    - Hepatitis C Screen Reflex to HCV RNA Quant and Genotype    6. Encounter for screening for HIV    - HIV Screening     BMI:   Estimated body mass index is 50.04 kg/m  as calculated from the following:    Height as of 7/30/19: 1.613 m (5' 3.5\").    Weight as of this encounter: 130.2 kg (287 lb).   Weight management plan: Patient referred to endocrine and/or weight management specialty Discussed healthy diet and exercise guidelines            Return in about 3 months (around 1/21/2020) for BP Recheck, Routine Visit.    Anh Acosta MD  [facility]    "

## 2019-10-21 ENCOUNTER — OFFICE VISIT (OUTPATIENT)
Dept: FAMILY MEDICINE | Facility: CLINIC | Age: 64
End: 2019-10-21
Payer: COMMERCIAL

## 2019-10-21 ENCOUNTER — ALLIED HEALTH/NURSE VISIT (OUTPATIENT)
Dept: EDUCATION SERVICES | Facility: CLINIC | Age: 64
End: 2019-10-21
Payer: COMMERCIAL

## 2019-10-21 VITALS
OXYGEN SATURATION: 96 % | DIASTOLIC BLOOD PRESSURE: 85 MMHG | WEIGHT: 287 LBS | HEART RATE: 75 BPM | TEMPERATURE: 98.2 F | BODY MASS INDEX: 50.04 KG/M2 | SYSTOLIC BLOOD PRESSURE: 138 MMHG

## 2019-10-21 DIAGNOSIS — Z79.4 TYPE 2 DIABETES MELLITUS WITHOUT COMPLICATION, WITH LONG-TERM CURRENT USE OF INSULIN (H): ICD-10-CM

## 2019-10-21 DIAGNOSIS — Z11.59 ENCOUNTER FOR HCV SCREENING TEST FOR LOW RISK PATIENT: ICD-10-CM

## 2019-10-21 DIAGNOSIS — G89.29 CHRONIC RIGHT SHOULDER PAIN: ICD-10-CM

## 2019-10-21 DIAGNOSIS — I10 BENIGN ESSENTIAL HYPERTENSION: ICD-10-CM

## 2019-10-21 DIAGNOSIS — E11.9 TYPE 2 DIABETES MELLITUS WITHOUT COMPLICATION, WITH LONG-TERM CURRENT USE OF INSULIN (H): Primary | ICD-10-CM

## 2019-10-21 DIAGNOSIS — E11.9 TYPE 2 DIABETES MELLITUS WITHOUT COMPLICATION, WITH LONG-TERM CURRENT USE OF INSULIN (H): ICD-10-CM

## 2019-10-21 DIAGNOSIS — Z11.4 ENCOUNTER FOR SCREENING FOR HIV: ICD-10-CM

## 2019-10-21 DIAGNOSIS — Z79.4 TYPE 2 DIABETES MELLITUS WITHOUT COMPLICATION, WITH LONG-TERM CURRENT USE OF INSULIN (H): Primary | ICD-10-CM

## 2019-10-21 DIAGNOSIS — E11.9 DIABETES MELLITUS WITHOUT COMPLICATION (H): ICD-10-CM

## 2019-10-21 DIAGNOSIS — M25.511 CHRONIC RIGHT SHOULDER PAIN: ICD-10-CM

## 2019-10-21 DIAGNOSIS — E66.813 CLASS 3 SEVERE OBESITY WITHOUT SERIOUS COMORBIDITY WITH BODY MASS INDEX (BMI) OF 50.0 TO 59.9 IN ADULT, UNSPECIFIED OBESITY TYPE (H): ICD-10-CM

## 2019-10-21 DIAGNOSIS — E66.01 CLASS 3 SEVERE OBESITY WITHOUT SERIOUS COMORBIDITY WITH BODY MASS INDEX (BMI) OF 50.0 TO 59.9 IN ADULT, UNSPECIFIED OBESITY TYPE (H): ICD-10-CM

## 2019-10-21 LAB
CREAT UR-MCNC: 117 MG/DL
MICROALBUMIN UR-MCNC: 5 MG/L
MICROALBUMIN/CREAT UR: 4.34 MG/G CR (ref 0–25)
TSH SERPL DL<=0.005 MIU/L-ACNC: 1.41 MU/L (ref 0.4–4)

## 2019-10-21 PROCEDURE — 82043 UR ALBUMIN QUANTITATIVE: CPT | Performed by: FAMILY MEDICINE

## 2019-10-21 PROCEDURE — 86803 HEPATITIS C AB TEST: CPT | Performed by: FAMILY MEDICINE

## 2019-10-21 PROCEDURE — G0108 DIAB MANAGE TRN  PER INDIV: HCPCS

## 2019-10-21 PROCEDURE — 99207 ZZC DROP WITH A PROCEDURE: CPT

## 2019-10-21 PROCEDURE — 36415 COLL VENOUS BLD VENIPUNCTURE: CPT | Performed by: FAMILY MEDICINE

## 2019-10-21 PROCEDURE — 87389 HIV-1 AG W/HIV-1&-2 AB AG IA: CPT | Performed by: FAMILY MEDICINE

## 2019-10-21 PROCEDURE — 99214 OFFICE O/P EST MOD 30 MIN: CPT | Performed by: FAMILY MEDICINE

## 2019-10-21 PROCEDURE — 84443 ASSAY THYROID STIM HORMONE: CPT | Performed by: FAMILY MEDICINE

## 2019-10-21 NOTE — PATIENT INSTRUCTIONS
Diabetes Support Resources:  Metformin  mg take 2 pills twice per day with meals  Tresiba U200 take 50 units daily  Ozempic 0.5 mg weekly , when finished will increase to 1.0 mg weekly    Goal is to lower the Tresiba insulin as long as BG are in good control     Bring blood glucose meter and logbook with you to all doctor and follow-up appointments.    Diabetes Education Telephone Visit Follow-up:    We realize your time is valuable and your health is important! We offer a convenient Telephone Visit follow up! It s a quick way to check in for a medication dose adjustment without having to come back to clinic as soon.    Telephone Visits are often covered by insurance. Please check with your insurance plan to see if this type of visit is covered. If not, the cost is less expensive than an office visit:      Up to 10 minutes (Code 96961): $30    11-20 minutes (Code 75265): $59    More than 20 minutes (Code 71210): $85    Talk with your Diabetes Educator if you want to learn more.      [facility]:  For Your Diabetes Education and Nutrition Appointments Call:  [phone]   For Diabetes Education or Nutrition Related Questions:   Phone: [phone]  E-mail: [email or web address]  Fax: [phone]   If you need a medication refill please contact your pharmacy. Please allow 3 business days for your refills to be completed.

## 2019-10-21 NOTE — PROGRESS NOTES
"Diabetes Self-Management Education & Support    Diabetes Education Self Management & Training    SUBJECTIVE/OBJECTIVE:  Presents for: Follow-up  Accompanied by: Self  Diabetes education in the past 24mo: Yes  Focus of Visit: Monitoring, Taking Medication, Being Active, Diabetes Pathophysiology, Self-care behavioral goal setting  Diabetes type: Type 2  Disease course: Improving  Diabetes management related comments/concerns: managing meals,   Transportation concerns: No  Other concerns:: None  Cultural Influences/Ethnic Background:  American      Diabetes Symptoms & Complications  Blurred vision: No  Fatigue: Yes  Neuropathy: No  Foot ulcerations: No  Polydipsia: No  Polyphagia: Yes  Polyuria: No  Visual change: No  Weakness: No  Weight loss: No  Slow healing wounds: No  Recent Infection(s): No  Other: Yes(recent surgery had kidney removed)  Symptom course: Improving  Weight trend: Stable  Autonomic neuropathy: No  CVA: No  Heart disease: No  Nephropathy: Yes  Peripheral neuropathy: No  Peripheral Vascular Disease: No  Retinopathy: No    Patient Problem List and Family Medical History reviewed for relevant medical history, current medical status, and diabetes risk factors.    Vitals:  There were no vitals taken for this visit.  Estimated body mass index is 50.04 kg/m  as calculated from the following:    Height as of 7/30/19: 1.613 m (5' 3.5\").    Weight as of an earlier encounter on 10/21/19: 130.2 kg (287 lb).   Last 3 BP:   BP Readings from Last 3 Encounters:   10/21/19 138/85   07/30/19 130/70       History   Smoking Status     Never Smoker   Smokeless Tobacco     Never Used       Labs:  Lab Results   Component Value Date    A1C 8.3 08/13/2019     Lab Results   Component Value Date     08/13/2019     Lab Results   Component Value Date     02/11/2019     HDL Cholesterol   Date Value Ref Range Status   02/11/2019 46 >40 mg/dL Final   ]  GFR Estimate   Date Value Ref Range Status   08/13/2019 64 >60 " mL/min/[1.73_m2] Final     Comment:     Non  GFR Calc  Starting 12/18/2018, serum creatinine based estimated GFR (eGFR) will be   calculated using the Chronic Kidney Disease Epidemiology Collaboration   (CKD-EPI) equation.       GFR Estimate If Black   Date Value Ref Range Status   08/13/2019 74 >60 mL/min/[1.73_m2] Final     Comment:      GFR Calc  Starting 12/18/2018, serum creatinine based estimated GFR (eGFR) will be   calculated using the Chronic Kidney Disease Epidemiology Collaboration   (CKD-EPI) equation.       Lab Results   Component Value Date    CR 0.95 08/13/2019     No results found for: MICROALBUMIN    Healthy Eating  Healthy Eating Assessed Today: Yes  Cultural/Presybeterian diet restrictions?: No  Meal planning: Smaller portions  Meals include: Lunch, Dinner, Snacks, Breakfast  Breakfast: gets up 5-8 am , skips, or may eat a bagel or english muffin or brittani shanika breakfast sandwich or egg and english muffin, today boiled egg and 2 toast  Lunch: @ 1330, turkey and cheese sandwich and veggies and milk or wild rice soup and 1/2 turkey , now 1/2 sandwich and fruit  Dinner: @ 6-7:00, Steak and hash browns, corn on the cob and veggies and milk or chicken wings diet coke or chicken breast and rice, now a meat and potato or salad, or veggie  Snacks: peach, bedtime, or snack of cherries and grapes  Beverages: Water, Coffee, Milk  Has patient met with a dietitian in the past?: No    Being Active  Being Active Assessed Today: Yes  Exercise:: Yes(starting to increase her walking, working on the treadmill)  How intense was your typical exercise? : Light (like stretching or slow walking)  Barrier to exercise: None    Monitoring  Monitoring Assessed Today: Yes  Did patient bring glucose meter to appointment? : No  Blood Glucose Meter: Accu-check  Home Glucose (Sugar) Monitoring: 3-4 times per day  Blood glucose trend: Decreasing steadily  Low Glucose Range (mg/dL): 70-90  High Glucose  Range (mg/dL): >200  Overall Range (mg/dL): 140-180        Taking Medications  Diabetes Medication(s)     Biguanides       metFORMIN (GLUCOPHAGE-XR) 500 MG 24 hr tablet    Take 2 tablets (1,000 mg) by mouth 2 times daily (with meals)    Insulin       insulin degludec (TRESIBA) 200 UNIT/ML pen    Inject 50 Units Subcutaneous daily Order pen needles too    Incretin Mimetic Agents (GLP-1 Receptor Agonists)       semaglutide (OZEMPIC, 1 MG/DOSE,) 2 MG/1.5ML pen    Inject 1 mg Subcutaneous every 7 days          Taking Medication Assessed Today: Yes  Current Treatments: Insulin Injections, Oral Agent (dual therapy)(Metformin  mg takes 4 tabs daily, Tresiba 50 units daily, Ozempic 0.5 mg weekly)  Given by: Patient  Injection/Infusion sites: Abdomen  Problems taking diabetes medications regularly?: No  Diabetes medication side effects?: No  Treatment Compliance: All of the time    Problem Solving  Problem Solving Assessed Today: Yes  Hypoglycemia Frequency: Weekly  Hypoglycemia Treatment: Glucose (tablets or gel), Other food  Patient carries a carbohydrate source: No  Medical alert: No  Severe weather/disaster plan for diabetes management?: No  DKA prevention plan?: No    Hypoglycemia symptoms  Confusion: Yes  Dizziness or Light-Headedness: Yes  Headaches: No  Hunger: No  Mood changes: No  Nervousness/Anxiety: Yes  Sleepiness: No  Speech difficulty: No  Sweats: No  Tremors: Yes    Hypoglycemia Complications  Blackouts: No  Hospitalization: No  Nocturnal hypoglycemia: No  Required assistance: No  Required glucagon injection: No  Seizures: No    Reducing Risks  Diabetes Risks: Age over 45 years, Hypertriglyceridemia  CAD Risks: Family history, Hypertension, Obesity  Has dilated eye exam at least once a year?: Yes  Sees dentist every 6 months?: Yes  Sees podiatrist (foot doctor)?: No    Healthy Coping  Emotional response to diabetes: Confidence diabetes can be controlled, Acceptance  Informal Support system:: Children,  Family, Friends, Spouse  Stage of change: ACTION (Actively working towards change)  Difficulty affording diabetes management supplies?: No  Patient Activation Measure Survey Score:  CRISTIN Score (Last Two) 7/30/2019   CRISTIN Raw Score 40   Activation Score 100   CRISTIN Level 4       ASSESSMENT:  Sheeba is here for follow up, she is doing well with the medications ordered, will remove Glimepiride today, not needed and once she has finished 0.5 mg of Ozempic, she will go to 1.0 mg weekly    Goals Addressed as of 10/21/2019 at 2:27 PM       Medication 1 (pt-stated)     Added 8/27/19 by Marcelina Bone, RN     My Goal: I will take the following meds    What I need to meet my goal: 1. Take Metformin  mg take 2 tabs twice daily with food  3. Start Ozempic 0.5 mg weekly    4. Tresiba U200 @ 50 units daily    I plan to meet my goal by this date: 3 month            Patient's most recent   Lab Results   Component Value Date    A1C 8.3 08/13/2019    is not meeting goal of <7.0    INTERVENTION:   Diabetes knowledge and skills assessment:     Patient is knowledgeable in diabetes management concepts related to: Healthy Eating, Being Active, Monitoring and Taking Medication    Patient needs further education on the following diabetes management concepts: Taking Medication    Based on learning assessment above, most appropriate setting for further diabetes education would be: Individual setting.    Education provided today on:  AADE Self-Care Behaviors:  Healthy Eating: carbohydrate counting, consistency in amount, composition, and timing of food intake, weight reduction, heart healthy diet, eating out, portion control, plate planning method and label reading  Being Active: relationship to blood glucose and describe appropriate activity program  Monitoring: purpose, proper technique, log and interpret results, individual blood glucose targets and frequency of monitoring  Taking Medication: action of prescribed medication, drawing up,  administering and storing injectable diabetes medications, proper site selection and rotation for injections, side effects of prescribed medications and when to take medications    Opportunities for ongoing education and support in diabetes-self management were discussed.    Pt verbalized understanding of concepts discussed and recommendations provided today.       Education Materials Provided:  No new materials provided today    PLAN:  See Patient Instructions for co-developed, patient-stated behavior change goals.  AVS printed and provided to patient today. See Follow-Up section for recommended follow-up.    Monica Bone RN/[facility] Diabetes Educator    Time Spent: 30 minutes  Encounter Type: Individual    Any diabetes medication dose changes were made via the CDE Protocol and Collaborative Practice Agreement with the patient's primary care provider and endocrinology provider. A copy of this encounter was shared with the provider.

## 2019-10-21 NOTE — LETTER
"    10/21/2019         RE: Sheeba Morris  [address]  [facility] [address]        Dear Kristine, you will be seeing Sheeba on Oct. 23.      Thank you for referring your patient, Sheeba Morris, to the [facility]. Please see a copy of my visit note below.    Diabetes Self-Management Education & Support    Diabetes Education Self Management & Training    SUBJECTIVE/OBJECTIVE:  Presents for: Follow-up  Accompanied by: Self  Diabetes education in the past 24mo: Yes  Focus of Visit: Monitoring, Taking Medication, Being Active, Diabetes Pathophysiology, Self-care behavioral goal setting  Diabetes type: Type 2  Disease course: Improving  Diabetes management related comments/concerns: managing meals,   Transportation concerns: No  Other concerns:: None  Cultural Influences/Ethnic Background:  American      Diabetes Symptoms & Complications  Blurred vision: No  Fatigue: Yes  Neuropathy: No  Foot ulcerations: No  Polydipsia: No  Polyphagia: Yes  Polyuria: No  Visual change: No  Weakness: No  Weight loss: No  Slow healing wounds: No  Recent Infection(s): No  Other: Yes(recent surgery had kidney removed)  Symptom course: Improving  Weight trend: Stable  Autonomic neuropathy: No  CVA: No  Heart disease: No  Nephropathy: Yes  Peripheral neuropathy: No  Peripheral Vascular Disease: No  Retinopathy: No    Patient Problem List and Family Medical History reviewed for relevant medical history, current medical status, and diabetes risk factors.    Vitals:  There were no vitals taken for this visit.  Estimated body mass index is 50.04 kg/m  as calculated from the following:    Height as of 7/30/19: 1.613 m (5' 3.5\").    Weight as of an earlier encounter on 10/21/19: 130.2 kg (287 lb).   Last 3 BP:   BP Readings from Last 3 Encounters:   10/21/19 138/85   07/30/19 130/70       History   Smoking Status     Never Smoker   Smokeless Tobacco     Never Used       Labs:  Lab Results   Component Value Date    " A1C 8.3 08/13/2019     Lab Results   Component Value Date     08/13/2019     Lab Results   Component Value Date     02/11/2019     HDL Cholesterol   Date Value Ref Range Status   02/11/2019 46 >40 mg/dL Final   ]  GFR Estimate   Date Value Ref Range Status   08/13/2019 64 >60 mL/min/[1.73_m2] Final     Comment:     Non  GFR Calc  Starting 12/18/2018, serum creatinine based estimated GFR (eGFR) will be   calculated using the Chronic Kidney Disease Epidemiology Collaboration   (CKD-EPI) equation.       GFR Estimate If Black   Date Value Ref Range Status   08/13/2019 74 >60 mL/min/[1.73_m2] Final     Comment:      GFR Calc  Starting 12/18/2018, serum creatinine based estimated GFR (eGFR) will be   calculated using the Chronic Kidney Disease Epidemiology Collaboration   (CKD-EPI) equation.       Lab Results   Component Value Date    CR 0.95 08/13/2019     No results found for: MICROALBUMIN    Healthy Eating  Healthy Eating Assessed Today: Yes  Cultural/Hinduism diet restrictions?: No  Meal planning: Smaller portions  Meals include: Lunch, Dinner, Snacks, Breakfast  Breakfast: gets up 5-8 am , skips, or may eat a bagel or english muffin or brittani shanika breakfast sandwich or egg and english muffin, today boiled egg and 2 toast  Lunch: @ 1330, turkey and cheese sandwich and veggies and milk or wild rice soup and 1/2 turkey , now 1/2 sandwich and fruit  Dinner: @ 6-7:00, Steak and hash browns, corn on the cob and veggies and milk or chicken wings diet coke or chicken breast and rice, now a meat and potato or salad, or veggie  Snacks: peach, bedtime, or snack of cherries and grapes  Beverages: Water, Coffee, Milk  Has patient met with a dietitian in the past?: No    Being Active  Being Active Assessed Today: Yes  Exercise:: Yes(starting to increase her walking, working on the treadmill)  How intense was your typical exercise? : Light (like stretching or slow walking)  Barrier to  exercise: None    Monitoring  Monitoring Assessed Today: Yes  Did patient bring glucose meter to appointment? : No  Blood Glucose Meter: Accu-check  Home Glucose (Sugar) Monitoring: 3-4 times per day  Blood glucose trend: Decreasing steadily  Low Glucose Range (mg/dL): 70-90  High Glucose Range (mg/dL): >200  Overall Range (mg/dL): 140-180        Taking Medications  Diabetes Medication(s)     Biguanides       metFORMIN (GLUCOPHAGE-XR) 500 MG 24 hr tablet    Take 2 tablets (1,000 mg) by mouth 2 times daily (with meals)    Insulin       insulin degludec (TRESIBA) 200 UNIT/ML pen    Inject 50 Units Subcutaneous daily Order pen needles too    Incretin Mimetic Agents (GLP-1 Receptor Agonists)       semaglutide (OZEMPIC, 1 MG/DOSE,) 2 MG/1.5ML pen    Inject 1 mg Subcutaneous every 7 days          Taking Medication Assessed Today: Yes  Current Treatments: Insulin Injections, Oral Agent (dual therapy)(Metformin  mg takes 4 tabs daily, Tresiba 50 units daily, Ozempic 0.5 mg weekly)  Given by: Patient  Injection/Infusion sites: Abdomen  Problems taking diabetes medications regularly?: No  Diabetes medication side effects?: No  Treatment Compliance: All of the time    Problem Solving  Problem Solving Assessed Today: Yes  Hypoglycemia Frequency: Weekly  Hypoglycemia Treatment: Glucose (tablets or gel), Other food  Patient carries a carbohydrate source: No  Medical alert: No  Severe weather/disaster plan for diabetes management?: No  DKA prevention plan?: No    Hypoglycemia symptoms  Confusion: Yes  Dizziness or Light-Headedness: Yes  Headaches: No  Hunger: No  Mood changes: No  Nervousness/Anxiety: Yes  Sleepiness: No  Speech difficulty: No  Sweats: No  Tremors: Yes    Hypoglycemia Complications  Blackouts: No  Hospitalization: No  Nocturnal hypoglycemia: No  Required assistance: No  Required glucagon injection: No  Seizures: No    Reducing Risks  Diabetes Risks: Age over 45 years, Hypertriglyceridemia  CAD Risks: Family  history, Hypertension, Obesity  Has dilated eye exam at least once a year?: Yes  Sees dentist every 6 months?: Yes  Sees podiatrist (foot doctor)?: No    Healthy Coping  Emotional response to diabetes: Confidence diabetes can be controlled, Acceptance  Informal Support system:: Children, Family, Friends, Spouse  Stage of change: ACTION (Actively working towards change)  Difficulty affording diabetes management supplies?: No  Patient Activation Measure Survey Score:  CRISTIN Score (Last Two) 7/30/2019   CRISTIN Raw Score 40   Activation Score 100   CRISTIN Level 4       ASSESSMENT:  Sheeba is here for follow up, she is doing well with the medications ordered, will remove Glimepiride today, not needed and once she has finished 0.5 mg of Ozempic, she will go to 1.0 mg weekly    Goals Addressed as of 10/21/2019 at 2:27 PM       Medication 1 (pt-stated)     Added 8/27/19 by Marcelina Bone RN     My Goal: I will take the following meds    What I need to meet my goal: 1. Take Metformin  mg take 2 tabs twice daily with food  3. Start Ozempic 0.5 mg weekly    4. Tresiba U200 @ 50 units daily    I plan to meet my goal by this date: 3 month            Patient's most recent   Lab Results   Component Value Date    A1C 8.3 08/13/2019    is not meeting goal of <7.0    INTERVENTION:   Diabetes knowledge and skills assessment:     Patient is knowledgeable in diabetes management concepts related to: Healthy Eating, Being Active, Monitoring and Taking Medication    Patient needs further education on the following diabetes management concepts: Taking Medication    Based on learning assessment above, most appropriate setting for further diabetes education would be: Individual setting.    Education provided today on:  AADE Self-Care Behaviors:  Healthy Eating: carbohydrate counting, consistency in amount, composition, and timing of food intake, weight reduction, heart healthy diet, eating out, portion control, plate planning method and label  reading  Being Active: relationship to blood glucose and describe appropriate activity program  Monitoring: purpose, proper technique, log and interpret results, individual blood glucose targets and frequency of monitoring  Taking Medication: action of prescribed medication, drawing up, administering and storing injectable diabetes medications, proper site selection and rotation for injections, side effects of prescribed medications and when to take medications    Opportunities for ongoing education and support in diabetes-self management were discussed.    Pt verbalized understanding of concepts discussed and recommendations provided today.       Education Materials Provided:  No new materials provided today    PLAN:  See Patient Instructions for co-developed, patient-stated behavior change goals.  AVS printed and provided to patient today. See Follow-Up section for recommended follow-up.    Monica Bone RN/[facility] Diabetes Educator    Time Spent: 30 minutes  Encounter Type: Individual    Any diabetes medication dose changes were made via the [facility] Protocol and Collaborative Practice Agreement with the patient's primary care provider and endocrinology provider. A copy of this encounter was shared with the provider.

## 2019-10-22 LAB
HCV AB SERPL QL IA: NONREACTIVE
HIV 1+2 AB+HIV1 P24 AG SERPL QL IA: NONREACTIVE

## 2019-10-22 RX ORDER — DILTIAZEM HYDROCHLORIDE 180 MG/1
CAPSULE, COATED, EXTENDED RELEASE ORAL
Qty: 90 CAPSULE | Refills: 0 | Status: SHIPPED | OUTPATIENT
Start: 2019-10-22 | End: 2019-12-20

## 2019-10-23 ENCOUNTER — OFFICE VISIT (OUTPATIENT)
Dept: ENDOCRINOLOGY | Facility: CLINIC | Age: 64
End: 2019-10-23
Payer: COMMERCIAL

## 2019-10-23 VITALS
OXYGEN SATURATION: 100 % | BODY MASS INDEX: 50.04 KG/M2 | DIASTOLIC BLOOD PRESSURE: 77 MMHG | WEIGHT: 287 LBS | SYSTOLIC BLOOD PRESSURE: 156 MMHG | HEART RATE: 69 BPM

## 2019-10-23 DIAGNOSIS — Z79.4 TYPE 2 DIABETES MELLITUS WITHOUT COMPLICATION, WITH LONG-TERM CURRENT USE OF INSULIN (H): Primary | ICD-10-CM

## 2019-10-23 DIAGNOSIS — I10 ESSENTIAL HYPERTENSION: ICD-10-CM

## 2019-10-23 DIAGNOSIS — E78.5 DYSLIPIDEMIA: ICD-10-CM

## 2019-10-23 DIAGNOSIS — E11.9 TYPE 2 DIABETES MELLITUS WITHOUT COMPLICATION, WITH LONG-TERM CURRENT USE OF INSULIN (H): Primary | ICD-10-CM

## 2019-10-23 PROCEDURE — 99204 OFFICE O/P NEW MOD 45 MIN: CPT | Performed by: INTERNAL MEDICINE

## 2019-10-23 RX ORDER — ICOSAPENT ETHYL 1 G/1
2 CAPSULE ORAL 2 TIMES DAILY
Qty: 360 CAPSULE | Refills: 3 | Status: SHIPPED | OUTPATIENT
Start: 2019-10-23

## 2019-10-23 NOTE — PROGRESS NOTES
"CC: DM.     HPI: Patient is here for management of DM.   Diagnosed ~10 years ago when presented with headaches.     Currently not doing any exercise.   She has right lower quadrant pain whenever she tries to use a treadmill.   This is a new issue since her surgery for renal cancer this summer.     She has been working with [employer] on diet.   Admits she does not always eat 3 meals a day.     Recently switched from basaglar to tresiba and stopped her glimiperide.   She was on actos in the past but this was stopped when she was diagnosed with renal cancer.     Metformin XR 1000 mg BID  Ozempic 0.5 mg weekly.   Tresiba U200 50 units daily    Meter:  Avg 157, SD 43  Highs clustered at [id number]    Post-breakfast reading a little higher since stopping glimiperide.     ROS: 10 point ROS neg other than the symptoms noted above in the HPI.    PMH:   Type 2 DM  HTN  Dyslipidemia    Meds:  Current Outpatient Medications   Medication     blood glucose (ACCU-CHEK GUIDE) test strip     blood glucose monitoring (ACCU-CHEK FASTCLIX) lancets     diltiazem ER COATED BEADS (CARDIZEM CD/CARTIA XT) 180 MG 24 hr capsule     insulin degludec (TRESIBA) 200 UNIT/ML pen     metFORMIN (GLUCOPHAGE-XR) 500 MG 24 hr tablet     semaglutide (OZEMPIC, 1 MG/DOSE,) 2 MG/1.5ML pen     valsartan-hydrochlorothiazide (DIOVAN HCT) 160-25 MG tablet     Continuous Blood Gluc  (FREESTYLE RUSTY 14 DAY READER) VENUS     Continuous Blood Gluc Sensor (FREESTYLE RUSTY 14 DAY SENSOR) MISC     STATIN NOT PRESCRIBED (INTENTIONAL)     No current facility-administered medications for this visit.      FHX:   Both parents have DM.     SHX:  Recently retired. Used to work in [employer] at [employer].   Non-smoker.     Exam:   Vital signs:      BP: (!) 156/77 Pulse: 69     SpO2: 100 %       Weight: 130.2 kg (287 lb)  Estimated body mass index is 50.04 kg/m  as calculated from the following:    Height as of 7/30/19: 1.613 m (5' 3.5\").    Weight as of " this encounter: 130.2 kg (287 lb).  Gen: In NAD.   HEENT: no proptosis or lid lag, EOMI, no palpable thyroid tissue.  Card: S1 S2 RRR no m/r/g. no LE edema.   Pulm: CTA b/l.   GI: NT ND +BS.   MSK: no gross deformities.   Derm: no rashes or lesions.   Neuro: no tremor, +2 DTR's. Normal monofilament.     A/P:   Type 2 DM - Outside records reviewed. She has actively been working on diet with CDE. Struggling with HS highs. Discussed SGLT-2 inhibitors. Cr has normalized since her surgery in 5/2019.    -I agree to increase ozempic to 1.0 mg after your finish your Rx for the 0.5 mg dose.   -Meet with Monica as planned.   -See me in 2 months with lab prior. If you are struggling, we will start jardiance.   -ASA recommended.   -Microalbumin negative in 10/2019. On valsartan.   -Eyes: no DR in 4/2019.   -Smoking: none.     HTN - elevated today. Repeat 152/88. Recent normal reading.   -Check blood pressure at home daily.   -If consistently >140 for the top number and/or >90 for the bottom number, call me.     Dyslipidemia - high triglycerides and high LDL in 2/2019. Statins have caused myalgias in the past. Discussed vascepa.   -Rx for vascepa 2 grams twice a day to help with cholesterol.     Mark Carmona MD on 10/23/2019 at 12:55 PM

## 2019-10-23 NOTE — LETTER
10/23/2019         RE: Sheeba Morris  [address]  [facility] [address]        Dear Colleague,    Thank you for referring your patient, Sheeba Morris, to the [facility]. Please see a copy of my visit note below.    CC: DM.     HPI: Patient is here for management of DM.   Diagnosed ~10 years ago when presented with headaches.     Currently not doing any exercise.   She has right lower quadrant pain whenever she tries to use a treadmill.   This is a new issue since her surgery for renal cancer this summer.     She has been working with CDE on diet.   Admits she does not always eat 3 meals a day.     Recently switched from basaglar to tresiba and stopped her glimiperide.   She was on actos in the past but this was stopped when she was diagnosed with renal cancer.     Metformin XR 1000 mg BID  Ozempic 0.5 mg weekly.   Tresiba U200 50 units daily    Meter:  Avg 157, SD 43  Highs clustered at [phone]    Post-breakfast reading a little higher since stopping glimiperide.     ROS: 10 point ROS neg other than the symptoms noted above in the HPI.    PMH:   Type 2 DM  HTN  Dyslipidemia    Meds:  Current Outpatient Medications   Medication     blood glucose (ACCU-CHEK GUIDE) test strip     blood glucose monitoring (ACCU-CHEK FASTCLIX) lancets     diltiazem ER COATED BEADS (CARDIZEM CD/CARTIA XT) 180 MG 24 hr capsule     insulin degludec (TRESIBA) 200 UNIT/ML pen     metFORMIN (GLUCOPHAGE-XR) 500 MG 24 hr tablet     semaglutide (OZEMPIC, 1 MG/DOSE,) 2 MG/1.5ML pen     valsartan-hydrochlorothiazide (DIOVAN HCT) 160-25 MG tablet     Continuous Blood Gluc  (FREESTYLE RUSTY 14 DAY READER) VENUS     Continuous Blood Gluc Sensor (FREESTYLE RUSTY 14 DAY SENSOR) MISC     STATIN NOT PRESCRIBED (INTENTIONAL)     No current facility-administered medications for this visit.      FHX:   Both parents have DM.     SHX:  Recently retired. Used to work in [employer] at [employer].   Non-smoker.  "    Exam:   Vital signs:      BP: (!) 156/77 Pulse: 69     SpO2: 100 %       Weight: 130.2 kg (287 lb)  Estimated body mass index is 50.04 kg/m  as calculated from the following:    Height as of 7/30/19: 1.613 m (5' 3.5\").    Weight as of this encounter: 130.2 kg (287 lb).  Gen: In NAD.   HEENT: no proptosis or lid lag, EOMI, no palpable thyroid tissue.  Card: S1 S2 RRR no m/r/g. no LE edema.   Pulm: CTA b/l.   GI: NT ND +BS.   MSK: no gross deformities.   Derm: no rashes or lesions.   Neuro: no tremor, +2 DTR's. Normal monofilament.     A/P:   Type 2 DM - Outside records reviewed. She has actively been working on diet with CDE. Struggling with HS highs. Discussed SGLT-2 inhibitors. Cr has normalized since her surgery in 5/2019.    -I agree to increase ozempic to 1.0 mg after your finish your Rx for the 0.5 mg dose.   -Meet with Monica as planned.   -See me in 2 months with lab prior. If you are struggling, we will start jardiance.   -ASA recommended.   -Microalbumin negative in 10/2019. On valsartan.   -Eyes: no DR in 4/2019.   -Smoking: none.     HTN - elevated today. Repeat 152/88. Recent normal reading.   -Check blood pressure at home daily.   -If consistently >140 for the top number and/or >90 for the bottom number, call me.     Dyslipidemia - high triglycerides and high LDL in 2/2019. Statins have caused myalgias in the past. Discussed vascepa.   -Rx for vascepa 2 grams twice a day to help with cholesterol.     Mark Carmona MD on 10/23/2019 at 12:55 PM      Again, thank you for allowing me to participate in the care of your patient.        Sincerely,        Mark Carmona MD    "

## 2019-10-23 NOTE — PATIENT INSTRUCTIONS
-I agree to increase ozempic to 1.0 mg after your finish your Rx for the 0.5 mg dose.   -Meet with Monica as planned.   -See me in 2 months with lab prior. If you are struggling, we will start jardiance.   -Rx for vascepa 2 grams twice a day to help with cholesterol.   -Resume taking aspirin 81 mg daily.   -Check blood pressure at home daily.   -If consistently >140 for the top number and/or >90 for the bottom number, call me.

## 2019-10-24 DIAGNOSIS — Z79.4 TYPE 2 DIABETES MELLITUS WITHOUT COMPLICATION, WITH LONG-TERM CURRENT USE OF INSULIN (H): Primary | ICD-10-CM

## 2019-10-24 DIAGNOSIS — E11.9 TYPE 2 DIABETES MELLITUS WITHOUT COMPLICATION, WITH LONG-TERM CURRENT USE OF INSULIN (H): Primary | ICD-10-CM

## 2019-10-24 RX ORDER — FLASH GLUCOSE SCANNING READER
1 EACH MISCELLANEOUS CONTINUOUS
Qty: 1 DEVICE | Refills: 0 | Status: SHIPPED | OUTPATIENT
Start: 2019-10-24

## 2019-10-24 RX ORDER — FLASH GLUCOSE SENSOR
1 KIT MISCELLANEOUS CONTINUOUS
Qty: 6 EACH | Refills: 11 | Status: SHIPPED | OUTPATIENT
Start: 2019-10-24

## 2019-10-25 DIAGNOSIS — Z85.528 HISTORY OF KIDNEY CANCER: Primary | ICD-10-CM

## 2019-11-05 ENCOUNTER — ANCILLARY PROCEDURE (OUTPATIENT)
Dept: ULTRASOUND IMAGING | Facility: CLINIC | Age: 64
End: 2019-11-05
Attending: FAMILY MEDICINE
Payer: COMMERCIAL

## 2019-11-05 DIAGNOSIS — M25.511 CHRONIC RIGHT SHOULDER PAIN: ICD-10-CM

## 2019-11-05 DIAGNOSIS — G89.29 CHRONIC RIGHT SHOULDER PAIN: ICD-10-CM

## 2019-11-05 PROCEDURE — 76882 US LMTD JT/FCL EVL NVASC XTR: CPT | Mod: RT

## 2019-11-07 ENCOUNTER — TELEPHONE (OUTPATIENT)
Dept: FAMILY MEDICINE | Facility: CLINIC | Age: 64
End: 2019-11-07

## 2019-11-07 DIAGNOSIS — R22.31 MASS OF SKIN OF RIGHT SHOULDER: Primary | ICD-10-CM

## 2019-11-07 NOTE — RESULT ENCOUNTER NOTE
Cas Aly,   Your Ultrasound showed small indeterminate mass.  I think we should obtain an MRI for better evaluation.  I placed an order for MRI please call this number to schedule [phone].      Anh Acosta MD.

## 2019-11-08 NOTE — TELEPHONE ENCOUNTER
Attempted to reach pt regarding provider result note and plan below. There was no answer. LM to return call to RN at [phone].    Per chart review, pt has not yet viewed MyChart result at this time.    Betty Alcaraz, RUFINAN, RN

## 2019-11-08 NOTE — TELEPHONE ENCOUNTER
Pt returned call to RN and notified of provider message below as written. Pt will call and schedule MRI. Pt indicates understanding of issues and agrees with the plan.    RN unable to locate the MRI order. Please review and order MRI.    EUGENE Hartman, RN

## 2019-11-18 ENCOUNTER — ANCILLARY PROCEDURE (OUTPATIENT)
Dept: MRI IMAGING | Facility: CLINIC | Age: 64
End: 2019-11-18
Attending: FAMILY MEDICINE
Payer: COMMERCIAL

## 2019-11-18 DIAGNOSIS — R22.31 MASS OF SKIN OF RIGHT SHOULDER: ICD-10-CM

## 2019-11-18 PROCEDURE — 73218 MRI UPPER EXTREMITY W/O DYE: CPT | Mod: TC

## 2019-12-05 ENCOUNTER — MYC MEDICAL ADVICE (OUTPATIENT)
Dept: FAMILY MEDICINE | Facility: CLINIC | Age: 64
End: 2019-12-05

## 2019-12-05 DIAGNOSIS — Z79.4 TYPE 2 DIABETES MELLITUS WITHOUT COMPLICATION, WITH LONG-TERM CURRENT USE OF INSULIN (H): ICD-10-CM

## 2019-12-05 DIAGNOSIS — E11.9 TYPE 2 DIABETES MELLITUS WITHOUT COMPLICATION, WITH LONG-TERM CURRENT USE OF INSULIN (H): ICD-10-CM

## 2019-12-05 DIAGNOSIS — R79.0 ABNORMAL LEVEL OF BLOOD MINERAL: Primary | ICD-10-CM

## 2019-12-05 LAB
FERRITIN SERPL-MCNC: 58 NG/ML (ref 8–252)
HBA1C MFR BLD: 7.8 % (ref 0–5.6)

## 2019-12-05 PROCEDURE — 83036 HEMOGLOBIN GLYCOSYLATED A1C: CPT | Performed by: INTERNAL MEDICINE

## 2019-12-05 PROCEDURE — 82728 ASSAY OF FERRITIN: CPT | Performed by: INTERNAL MEDICINE

## 2019-12-05 PROCEDURE — 36415 COLL VENOUS BLD VENIPUNCTURE: CPT | Performed by: INTERNAL MEDICINE

## 2019-12-05 RX ORDER — VALSARTAN AND HYDROCHLOROTHIAZIDE 160; 25 MG/1; MG/1
1 TABLET ORAL DAILY
Qty: 90 TABLET | Refills: 3 | Status: SHIPPED | OUTPATIENT
Start: 2019-12-05 | End: 2020-06-04

## 2019-12-05 RX ORDER — VALSARTAN AND HYDROCHLOROTHIAZIDE 160; 25 MG/1; MG/1
1 TABLET ORAL DAILY
Qty: 90 TABLET | Refills: 0 | Status: SHIPPED | OUTPATIENT
Start: 2019-12-05 | End: 2019-12-05

## 2019-12-09 ENCOUNTER — ALLIED HEALTH/NURSE VISIT (OUTPATIENT)
Dept: EDUCATION SERVICES | Facility: CLINIC | Age: 64
End: 2019-12-09
Payer: COMMERCIAL

## 2019-12-09 DIAGNOSIS — Z79.4 TYPE 2 DIABETES MELLITUS WITHOUT COMPLICATION, WITH LONG-TERM CURRENT USE OF INSULIN (H): Primary | ICD-10-CM

## 2019-12-09 DIAGNOSIS — E11.9 TYPE 2 DIABETES MELLITUS WITHOUT COMPLICATION, WITH LONG-TERM CURRENT USE OF INSULIN (H): Primary | ICD-10-CM

## 2019-12-09 DIAGNOSIS — E11.9 DIABETES MELLITUS WITHOUT COMPLICATION (H): ICD-10-CM

## 2019-12-09 PROCEDURE — 99207 ZZC DROP WITH A PROCEDURE: CPT

## 2019-12-09 PROCEDURE — G0108 DIAB MANAGE TRN  PER INDIV: HCPCS

## 2019-12-09 RX ORDER — PROCHLORPERAZINE 25 MG/1
1 SUPPOSITORY RECTAL CONTINUOUS
Qty: 1 DEVICE | Refills: 0 | Status: SHIPPED | OUTPATIENT
Start: 2019-12-09

## 2019-12-09 RX ORDER — PROCHLORPERAZINE 25 MG/1
1 SUPPOSITORY RECTAL CONTINUOUS
Qty: 1 EACH | Refills: 3 | Status: SHIPPED | OUTPATIENT
Start: 2019-12-09

## 2019-12-09 RX ORDER — PROCHLORPERAZINE 25 MG/1
1 SUPPOSITORY RECTAL CONTINUOUS
Qty: 9 EACH | Refills: 11 | Status: SHIPPED | OUTPATIENT
Start: 2019-12-09

## 2019-12-09 RX ORDER — METFORMIN HCL 500 MG
1000 TABLET, EXTENDED RELEASE 24 HR ORAL 2 TIMES DAILY WITH MEALS
Qty: 360 TABLET | Refills: 1 | Status: SHIPPED | OUTPATIENT
Start: 2019-12-09

## 2019-12-09 NOTE — LETTER
"    12/9/2019         RE: Sheeba Morris  [address]  [facility] [address]        Dear Kristine,  I started Sheeba on Jardiance per your note at 10 mg daily.  She sees you on Wed.  Let me know what else we can do for her.     Thank you for referring your patient, Sheeba Morris, [facility] Please see a copy of my visit note below.    Diabetes Self-Management Education & Support    Diabetes Education Self Management & Training    SUBJECTIVE/OBJECTIVE:  Presents for: Follow-up  Accompanied by: Self  Diabetes education in the past 24mo: Yes  Focus of Visit: Taking Medication, GLP-1 Start  GLP-1 Type: Ozempic  Diabetes type: Type 2  Disease course: Stable  Transportation concerns: No  Other concerns:: None  Cultural Influences/Ethnic Background:  American      Diabetes Symptoms & Complications  Blurred vision: No  Fatigue: Yes  Foot ulcerations: No  Polydipsia: No  Polyphagia: No  Polyuria: No  Visual change: No  Weakness: No  Weight loss: No  Slow healing wounds: No  Recent Infection(s): Yes(had the cold going around)  Symptom course: Stable  Weight trend: Stable  Autonomic neuropathy: No  CVA: No  Heart disease: No  Nephropathy: No  Peripheral neuropathy: No  Peripheral Vascular Disease: No  Retinopathy: No  Sexual dysfunction: No    Patient Problem List and Family Medical History reviewed for relevant medical history, current medical status, and diabetes risk factors.    Vitals:  There were no vitals taken for this visit.  Estimated body mass index is 50.04 kg/m  as calculated from the following:    Height as of 7/30/19: 1.613 m (5' 3.5\").    Weight as of 10/23/19: 130.2 kg (287 lb).   Last 3 BP:   BP Readings from Last 3 Encounters:   10/23/19 (!) 156/77   10/21/19 138/85   07/30/19 130/70       History   Smoking Status     Never Smoker   Smokeless Tobacco     Never Used       Labs:  Lab Results   Component Value Date    A1C 7.8 12/05/2019     Lab Results   Component Value " Date     08/13/2019     Lab Results   Component Value Date     02/11/2019     HDL Cholesterol   Date Value Ref Range Status   02/11/2019 46 >40 mg/dL Final   ]  GFR Estimate   Date Value Ref Range Status   08/13/2019 64 >60 mL/min/[1.73_m2] Final     Comment:     Non  GFR Calc  Starting 12/18/2018, serum creatinine based estimated GFR (eGFR) will be   calculated using the Chronic Kidney Disease Epidemiology Collaboration   (CKD-EPI) equation.       GFR Estimate If Black   Date Value Ref Range Status   08/13/2019 74 >60 mL/min/[1.73_m2] Final     Comment:      GFR Calc  Starting 12/18/2018, serum creatinine based estimated GFR (eGFR) will be   calculated using the Chronic Kidney Disease Epidemiology Collaboration   (CKD-EPI) equation.       Lab Results   Component Value Date    CR 0.95 08/13/2019     No results found for: MICROALBUMIN    Healthy Eating  Cultural/Faith diet restrictions?: No  Meal planning: Avoiding sweets, Carbohydrate counting  Meals include: Breakfast, Lunch, Dinner, Snacks  Breakfast: bread 2 slices and an egg.  and coffee  Lunch: 1/2 sandwich and soup, or some fruits  Dinner: grills meat and veggie or potato, and some days pasta  Snacks: clemintines or apples and grapes  Beverages: Water, Coffee, Milk  Has patient met with a dietitian in the past?: Yes    Being Active  Being Active Assessed Today: Yes  How intense was your typical exercise? : Light (like stretching or slow walking)  Barrier to exercise: None    Monitoring  Blood Glucose Meter: Accu-check  Home Glucose (Sugar) Monitoring: 3-4 times per day  Blood glucose trend: Fluctuating minimally  Low Glucose Range (mg/dL): 130-140  High Glucose Range (mg/dL): >200  Overall Range (mg/dL): 140-180        Taking Medications  Diabetes Medication(s)     Biguanides       metFORMIN (GLUCOPHAGE-XR) 500 MG 24 hr tablet    Take 2 tablets (1,000 mg) by mouth 2 times daily (with meals)    Insulin        insulin degludec (TRESIBA) 200 UNIT/ML pen    Inject 54 Units Subcutaneous daily Order pen needles too    Sodium-Glucose Co-Transporter 2 (SGLT2) Inhibitors       empagliflozin (JARDIANCE) 10 MG TABS tablet    Take 1 tablet (10 mg) by mouth daily    Incretin Mimetic Agents (GLP-1 Receptor Agonists)       semaglutide (OZEMPIC, 1 MG/DOSE,) 2 MG/1.5ML pen    Inject 1 mg Subcutaneous every 7 days          Taking Medication Assessed Today: No  Current Treatments: Insulin Injections, Oral Agent (dual therapy)(Tresiba U200 50 units, Ozempic 1.0 mg , Metformin  mg take 2 pills twice daily, )  Dose schedule: pre-breakfast  Given by: Patient  Injection/Infusion sites: Abdomen  Problems taking diabetes medications regularly?: No  Diabetes medication side effects?: No  Treatment Compliance: All of the time    Problem Solving  Problem Solving Assessed Today: Yes  Hypoglycemia Frequency: Rarely  Hypoglycemia Treatment: Juice  Patient carries a carbohydrate source: No  Medical alert: No  Severe weather/disaster plan for diabetes management?: No  DKA prevention plan?: No  Sick day plan for diabetes management?: (P) No    Hypoglycemia symptoms  Confusion: No  Dizziness or Light-Headedness: No  Headaches: Yes  Hunger: No  Mood changes: No  Nervousness/Anxiety: No  Sleepiness: Yes  Speech difficulty: No  Sweats: No  Tremors: Yes    Hypoglycemia Complications  Blackouts: No  Hospitalization: No  Nocturnal hypoglycemia: No  Required assistance: No  Required glucagon injection: No  Seizures: No    Reducing Risks  CAD Risks: Family history, Hypertension, Obesity, Post-menopausal  Has dilated eye exam at least once a year?: Yes  Sees dentist every 6 months?: Yes  Sees podiatrist (foot doctor)?: No    Healthy Coping  Emotional response to diabetes: Acceptance  Informal Support system:: Children, Family, Friends, Spouse  Stage of change: ACTION (Actively working towards change)  Difficulty affording diabetes management supplies?:  No  Patient Activation Measure Survey Score:  CRISTIN Score (Last Two) 7/30/2019   CRISTIN Raw Score 40   Activation Score 100   CRISTIN Level 4       ASSESSMENT:  Sheeba's BG remain high despite her taking Ozempic 1.0 mg dose , she is following up with Dr. Carmona this week.  Will start her on Jardiance per his note.    Goals Addressed as of 12/9/2019 at 5:10 PM                 9/23/19      Being Active (pt-stated)       Added 9/23/19 by Marcelina Bone, RN     My Goal: I will walk more and treadmill    What I need to meet my goal: 1. Will start with 2 days per week, for 10 minutes    I plan to meet my goal by this date: 1 month          Healthy Eating (pt-stated)   90%    Added 8/27/19 by Marcelina Bone, RN     My Goal: I will follow the meal plan    What I need to meet my goal: 1. 3 meals per day, with proteins, try for 30-45 gms each meal   2. 2-3 snacks each day with proteins and carbs  3. 6 small meals is better than 3 bigger meals with snacks    I plan to meet my goal by this date: 1 month          Medication 1 (pt-stated)       Added 8/27/19 by Marcelina Bone, RN     My Goal: I will take the following meds    What I need to meet my goal: 1. Take Metformin  mg take 2 tabs twice daily with food  3. Start Ozempic 1.0 mg weekly    4. Tresiba U200 @ 54 units daily  5. Begin Jardiance 10 mg daily    I plan to meet my goal by this date: 3 month            Patient's most recent   Lab Results   Component Value Date    A1C 7.8 12/05/2019    is not meeting goal of <7.0    INTERVENTION:   Diabetes knowledge and skills assessment:     Patient is knowledgeable in diabetes management concepts related to: Healthy Eating, Being Active, Monitoring and Taking Medication    Patient needs further education on the following diabetes management concepts: Taking Medication    Based on learning assessment above, most appropriate setting for further diabetes education would be: Individual setting.    Education provided today on:  AUBREE  Self-Care Behaviors:  Being Active: relationship to blood glucose and describe appropriate activity program  Taking Medication: action of prescribed medication, drawing up, administering and storing injectable diabetes medications, proper site selection and rotation for injections, side effects of prescribed medications and when to take medications    Opportunities for ongoing education and support in diabetes-self management were discussed.    Pt verbalized understanding of concepts discussed and recommendations provided today.       Education Materials Provided:  No new materials provided today  I did provide her information regarding Jardiance     PLAN:  See Patient Instructions for co-developed, patient-stated behavior change goals.  AVS printed and provided to patient today. See Follow-Up section for recommended follow-up.  Increase her Tresiba and start her on Jardiance per Dr. Cannon note.    Monica Bone RN/MYA  [facility] Educator    Time Spent: 60 minutes  Encounter Type: Individual    Any diabetes medication dose changes were made via the CDE Protocol and Collaborative Practice Agreement with the patient's primary care provider and endocrinology provider. A copy of this encounter was shared with the provider.

## 2019-12-09 NOTE — PATIENT INSTRUCTIONS
Diabetes Support Resources:  Begin Jardiance 10 mg daily, let us know if you develop UTI.      Increase Tresiba U200 to 54 units daily     Bring blood glucose meter and logbook with you to all doctor and follow-up appointments.    Diabetes Education Telephone Visit Follow-up:    We realize your time is valuable and your health is important! We offer a convenient Telephone Visit follow up! It s a quick way to check in for a medication dose adjustment without having to come back to clinic as soon.    Telephone Visits are often covered by insurance. Please check with your insurance plan to see if this type of visit is covered. If not, the cost is less expensive than an office visit:      Up to 10 minutes (Code 18301): $30    11-20 minutes (Code 29283): $59    More than 20 minutes (Code 16184): $85    Talk with your Diabetes Educator if you want to learn more.      [facility]:  For Your Diabetes Education and Nutrition Appointments Call:  [phone]   For Diabetes Education or Nutrition Related Questions:   Phone: [phone]  E-mail: [email or web address]  Fax: [phone]   If you need a medication refill please contact your pharmacy. Please allow 3 business days for your refills to be completed.

## 2019-12-09 NOTE — PROGRESS NOTES
"Diabetes Self-Management Education & Support    Diabetes Education Self Management & Training    SUBJECTIVE/OBJECTIVE:  Presents for: Follow-up  Accompanied by: Self  Diabetes education in the past 24mo: Yes  Focus of Visit: Taking Medication, GLP-1 Start  GLP-1 Type: Ozempic  Diabetes type: Type 2  Disease course: Stable  Transportation concerns: No  Other concerns:: None  Cultural Influences/Ethnic Background:  American      Diabetes Symptoms & Complications  Blurred vision: No  Fatigue: Yes  Foot ulcerations: No  Polydipsia: No  Polyphagia: No  Polyuria: No  Visual change: No  Weakness: No  Weight loss: No  Slow healing wounds: No  Recent Infection(s): Yes(had the cold going around)  Symptom course: Stable  Weight trend: Stable  Autonomic neuropathy: No  CVA: No  Heart disease: No  Nephropathy: No  Peripheral neuropathy: No  Peripheral Vascular Disease: No  Retinopathy: No  Sexual dysfunction: No    Patient Problem List and Family Medical History reviewed for relevant medical history, current medical status, and diabetes risk factors.    Vitals:  There were no vitals taken for this visit.  Estimated body mass index is 50.04 kg/m  as calculated from the following:    Height as of 7/30/19: 1.613 m (5' 3.5\").    Weight as of 10/23/19: 130.2 kg (287 lb).   Last 3 BP:   BP Readings from Last 3 Encounters:   10/23/19 (!) 156/77   10/21/19 138/85   07/30/19 130/70       History   Smoking Status     Never Smoker   Smokeless Tobacco     Never Used       Labs:  Lab Results   Component Value Date    A1C 7.8 12/05/2019     Lab Results   Component Value Date     08/13/2019     Lab Results   Component Value Date     02/11/2019     HDL Cholesterol   Date Value Ref Range Status   02/11/2019 46 >40 mg/dL Final   ]  GFR Estimate   Date Value Ref Range Status   08/13/2019 64 >60 mL/min/[1.73_m2] Final     Comment:     Non  GFR Calc  Starting 12/18/2018, serum creatinine based estimated GFR (eGFR) will " be   calculated using the Chronic Kidney Disease Epidemiology Collaboration   (CKD-EPI) equation.       GFR Estimate If Black   Date Value Ref Range Status   08/13/2019 74 >60 mL/min/[1.73_m2] Final     Comment:      GFR Calc  Starting 12/18/2018, serum creatinine based estimated GFR (eGFR) will be   calculated using the Chronic Kidney Disease Epidemiology Collaboration   (CKD-EPI) equation.       Lab Results   Component Value Date    CR 0.95 08/13/2019     No results found for: MICROALBUMIN    Healthy Eating  Cultural/Denominational diet restrictions?: No  Meal planning: Avoiding sweets, Carbohydrate counting  Meals include: Breakfast, Lunch, Dinner, Snacks  Breakfast: bread 2 slices and an egg.  and coffee  Lunch: 1/2 sandwich and soup, or some fruits  Dinner: grills meat and veggie or potato, and some days pasta  Snacks: clemintines or apples and grapes  Beverages: Water, Coffee, Milk  Has patient met with a dietitian in the past?: Yes    Being Active  Being Active Assessed Today: Yes  How intense was your typical exercise? : Light (like stretching or slow walking)  Barrier to exercise: None    Monitoring  Blood Glucose Meter: Accu-check  Home Glucose (Sugar) Monitoring: 3-4 times per day  Blood glucose trend: Fluctuating minimally  Low Glucose Range (mg/dL): 130-140  High Glucose Range (mg/dL): >200  Overall Range (mg/dL): 140-180        Taking Medications  Diabetes Medication(s)     Biguanides       metFORMIN (GLUCOPHAGE-XR) 500 MG 24 hr tablet    Take 2 tablets (1,000 mg) by mouth 2 times daily (with meals)    Insulin       insulin degludec (TRESIBA) 200 UNIT/ML pen    Inject 54 Units Subcutaneous daily Order pen needles too    Sodium-Glucose Co-Transporter 2 (SGLT2) Inhibitors       empagliflozin (JARDIANCE) 10 MG TABS tablet    Take 1 tablet (10 mg) by mouth daily    Incretin Mimetic Agents (GLP-1 Receptor Agonists)       semaglutide (OZEMPIC, 1 MG/DOSE,) 2 MG/1.5ML pen    Inject 1 mg Subcutaneous  every 7 days          Taking Medication Assessed Today: No  Current Treatments: Insulin Injections, Oral Agent (dual therapy)(Tresiba U200 50 units, Ozempic 1.0 mg , Metformin  mg take 2 pills twice daily, )  Dose schedule: pre-breakfast  Given by: Patient  Injection/Infusion sites: Abdomen  Problems taking diabetes medications regularly?: No  Diabetes medication side effects?: No  Treatment Compliance: All of the time    Problem Solving  Problem Solving Assessed Today: Yes  Hypoglycemia Frequency: Rarely  Hypoglycemia Treatment: Juice  Patient carries a carbohydrate source: No  Medical alert: No  Severe weather/disaster plan for diabetes management?: No  DKA prevention plan?: No  Sick day plan for diabetes management?: (P) No    Hypoglycemia symptoms  Confusion: No  Dizziness or Light-Headedness: No  Headaches: Yes  Hunger: No  Mood changes: No  Nervousness/Anxiety: No  Sleepiness: Yes  Speech difficulty: No  Sweats: No  Tremors: Yes    Hypoglycemia Complications  Blackouts: No  Hospitalization: No  Nocturnal hypoglycemia: No  Required assistance: No  Required glucagon injection: No  Seizures: No    Reducing Risks  CAD Risks: Family history, Hypertension, Obesity, Post-menopausal  Has dilated eye exam at least once a year?: Yes  Sees dentist every 6 months?: Yes  Sees podiatrist (foot doctor)?: No    Healthy Coping  Emotional response to diabetes: Acceptance  Informal Support system:: Children, Family, Friends, Spouse  Stage of change: ACTION (Actively working towards change)  Difficulty affording diabetes management supplies?: No  Patient Activation Measure Survey Score:  CRISTIN Score (Last Two) 7/30/2019   CRISTIN Raw Score 40   Activation Score 100   CRISTIN Level 4       ASSESSMENT:  Sheeba's BG remain high despite her taking Ozempic 1.0 mg dose , she is following up with Dr. Carmona this week.  Will start her on Jardiance per his note.    Goals Addressed as of 12/9/2019 at 5:10 PM                 9/23/19       Being Active (pt-stated)       Added 9/23/19 by Marcelina Bone, RN     My Goal: I will walk more and treadmill    What I need to meet my goal: 1. Will start with 2 days per week, for 10 minutes    I plan to meet my goal by this date: 1 month          Healthy Eating (pt-stated)   90%    Added 8/27/19 by Marcelina Bone, RN     My Goal: I will follow the meal plan    What I need to meet my goal: 1. 3 meals per day, with proteins, try for 30-45 gms each meal   2. 2-3 snacks each day with proteins and carbs  3. 6 small meals is better than 3 bigger meals with snacks    I plan to meet my goal by this date: 1 month          Medication 1 (pt-stated)       Added 8/27/19 by Marcelina Bone, RN     My Goal: I will take the following meds    What I need to meet my goal: 1. Take Metformin  mg take 2 tabs twice daily with food  3. Start Ozempic 1.0 mg weekly    4. Tresiba U200 @ 54 units daily  5. Begin Jardiance 10 mg daily    I plan to meet my goal by this date: 3 month            Patient's most recent   Lab Results   Component Value Date    A1C 7.8 12/05/2019    is not meeting goal of <7.0    INTERVENTION:   Diabetes knowledge and skills assessment:     Patient is knowledgeable in diabetes management concepts related to: Healthy Eating, Being Active, Monitoring and Taking Medication    Patient needs further education on the following diabetes management concepts: Taking Medication    Based on learning assessment above, most appropriate setting for further diabetes education would be: Individual setting.    Education provided today on:  AADE Self-Care Behaviors:  Being Active: relationship to blood glucose and describe appropriate activity program  Taking Medication: action of prescribed medication, drawing up, administering and storing injectable diabetes medications, proper site selection and rotation for injections, side effects of prescribed medications and when to take medications    Opportunities for ongoing  education and support in diabetes-self management were discussed.    Pt verbalized understanding of concepts discussed and recommendations provided today.       Education Materials Provided:  No new materials provided today  I did provide her information regarding Jardiance     PLAN:  See Patient Instructions for co-developed, patient-stated behavior change goals.  AVS printed and provided to patient today. See Follow-Up section for recommended follow-up.  Increase her Tresiba and start her on Jardiance per Dr. Cannon note.    Monica Bone RN/MYA  [facility] Diabetes Educator    Time Spent: 60 minutes  Encounter Type: Individual    Any diabetes medication dose changes were made via the CDE Protocol and Collaborative Practice Agreement with the patient's primary care provider and endocrinology provider. A copy of this encounter was shared with the provider.

## 2019-12-12 ENCOUNTER — TELEPHONE (OUTPATIENT)
Dept: FAMILY MEDICINE | Facility: CLINIC | Age: 64
End: 2019-12-12

## 2019-12-12 NOTE — TELEPHONE ENCOUNTER
Pharmacy had called about patients diabetic monitor that was ordered by primary doctor and wanted us to be aware that insurance rejecting after 30 days and having to appeal for it.     Could you please call Araseli back at [phone] to follow up about the information about insurance. She is from [facility].    JUNG Peterson

## 2019-12-12 NOTE — TELEPHONE ENCOUNTER
MEDICAL    PRIOR AUTHORIZATION REQUIRED - See Reason for Call Comments for specific products. Billing with (A ) codes.    INSURANCE:   ID:           [facility]  [phone]    Route determinations back to [facility] Diabetes pool ([id number])

## 2019-12-17 NOTE — TELEPHONE ENCOUNTER
PA Initiation    Medication: DEXCOM G6 , DEXCOM G6 TRANSMITTER & DEXCOM G6 SENSORS - Initaited   Insurance Company: [employer] - Phone [phone] Fax [phone]  Pharmacy Filling the Rx: [facility] - [place], [place] - [address]  Filling Pharmacy Phone:    Filling Pharmacy Fax:    Start Date: 12/17/2019

## 2019-12-18 ENCOUNTER — OFFICE VISIT (OUTPATIENT)
Dept: ENDOCRINOLOGY | Facility: CLINIC | Age: 64
End: 2019-12-18
Payer: COMMERCIAL

## 2019-12-18 VITALS
WEIGHT: 283 LBS | RESPIRATION RATE: 16 BRPM | HEART RATE: 71 BPM | BODY MASS INDEX: 49.34 KG/M2 | OXYGEN SATURATION: 95 % | DIASTOLIC BLOOD PRESSURE: 68 MMHG | SYSTOLIC BLOOD PRESSURE: 135 MMHG

## 2019-12-18 DIAGNOSIS — E78.5 DYSLIPIDEMIA: ICD-10-CM

## 2019-12-18 DIAGNOSIS — E11.9 TYPE 2 DIABETES MELLITUS WITHOUT COMPLICATION, WITH LONG-TERM CURRENT USE OF INSULIN (H): Primary | ICD-10-CM

## 2019-12-18 DIAGNOSIS — I10 ESSENTIAL HYPERTENSION: ICD-10-CM

## 2019-12-18 DIAGNOSIS — Z79.4 TYPE 2 DIABETES MELLITUS WITHOUT COMPLICATION, WITH LONG-TERM CURRENT USE OF INSULIN (H): Primary | ICD-10-CM

## 2019-12-18 PROCEDURE — 99214 OFFICE O/P EST MOD 30 MIN: CPT | Performed by: INTERNAL MEDICINE

## 2019-12-18 NOTE — PATIENT INSTRUCTIONS
-Continue the jardiance.   -Walk for 10-15 minutes on the treadmill after meals whenever possible.   -Consider having smaller more frequent meals as well.   -Labs in 3 months.

## 2019-12-18 NOTE — LETTER
"    12/18/2019         RE: Sheeba Morris  [address]  [place] [address]        Dear Colleague,    Thank you for referring your patient, Sheeba Morris, to the [facility]. Please see a copy of my visit note below.    CC: DM.     HPI: Patient is here for management of DM.   Diagnosed ~10 years ago when presented with headaches.     Switched from basaglar to tresiba and stopped her glimiperide.   She was on actos in the past but this was stopped when she was diagnosed with renal cancer.     Metformin XR 1000 mg BID  Ozempic 1.0 mg weekly.   Tresiba U200 54 units daily  Jardiance 10 mg Daily ---> began on 12/9/19.     Meter:  Avg 214, SD 49  AM readings 131-266  PP readings mostly above 200.     Estimates she is eating no more than 60 grams of carbs with meals.   No regular exercise. She does have a treadmill in the house.     ROS: 10 point ROS neg other than the symptoms noted above in the HPI.    Exam:   Vital signs:      BP: 135/68 Pulse: 71   Resp: 16 SpO2: 95 %       Weight: 128.4 kg (283 lb)  Estimated body mass index is 49.34 kg/m  as calculated from the following:    Height as of 7/30/19: 1.613 m (5' 3.5\").    Weight as of this encounter: 128.4 kg (283 lb).  Gen: In NAD.   HEENT: no proptosis or lid lag, EOMI, MMM.     A/P:   Type 2 DM - Outside records reviewed. She has actively been working on diet with CDE. Struggling with HS highs. Discussed SGLT-2 inhibitors. Cr has normalized since her surgery in 5/2019.    In 12/2019, 7.8%. She is discouraged by her post prandial readings. Discussed smaller more frequent meals, incorporating exercise. Also discussed reasoning of uing agents other than insulin and glipizide (less hypoglycemia, weight loss, reduced CV risk).  -Continue the jardiance.   -Walk for 10-15 minutes on the treadmill after meals whenever possible.   -Consider having smaller more frequent meals as well.   -Labs in 3 months.   -ASA recommended.   -Microalbumin negative in " 10/2019. On valsartan.   -Eyes: no DR in 4/2019.   -Smoking: none.     HTN - controlled. No change to medication needed.      Dyslipidemia - high triglycerides and high LDL in 2/2019. Statins have caused myalgias in the past. Discussed vascepa.   She is tolerating vascepa 2 grams BID.   -Lipid panel with next labs.     I spent 25 minutes with the patient. Greater than 50% of the time spent in . Risks/benefits/alternatives reviewed.     Mark Carmona MD on 12/18/2019 at 10:18 AM        Again, thank you for allowing me to participate in the care of your patient.        Sincerely,        Mark Carmona MD

## 2019-12-18 NOTE — PROGRESS NOTES
"CC: DM.     HPI: Patient is here for management of DM.   Diagnosed ~10 years ago when presented with headaches.     Switched from basaglar to tresiba and stopped her glimiperide.   She was on actos in the past but this was stopped when she was diagnosed with renal cancer.     Metformin XR 1000 mg BID  Ozempic 1.0 mg weekly.   Tresiba U200 54 units daily  Jardiance 10 mg Daily ---> began on 12/9/19.     Meter:  Avg 214, SD 49  AM readings 131-266  PP readings mostly above 200.     Estimates she is eating no more than 60 grams of carbs with meals.   No regular exercise. She does have a treadmill in the house.     ROS: 10 point ROS neg other than the symptoms noted above in the HPI.    Exam:   Vital signs:      BP: 135/68 Pulse: 71   Resp: 16 SpO2: 95 %       Weight: 128.4 kg (283 lb)  Estimated body mass index is 49.34 kg/m  as calculated from the following:    Height as of 7/30/19: 1.613 m (5' 3.5\").    Weight as of this encounter: 128.4 kg (283 lb).  Gen: In NAD.   HEENT: no proptosis or lid lag, EOMI, MMM.     A/P:   Type 2 DM - Outside records reviewed. She has actively been working on diet with CDE. Struggling with HS highs. Discussed SGLT-2 inhibitors. Cr has normalized since her surgery in 5/2019.    In 12/2019, 7.8%. She is discouraged by her post prandial readings. Discussed smaller more frequent meals, incorporating exercise. Also discussed reasoning of uing agents other than insulin and glipizide (less hypoglycemia, weight loss, reduced CV risk).  -Continue the jardiance.   -Walk for 10-15 minutes on the treadmill after meals whenever possible.   -Consider having smaller more frequent meals as well.   -Labs in 3 months.   -ASA recommended.   -Microalbumin negative in 10/2019. On valsartan.   -Eyes: no DR in 4/2019.   -Smoking: none.     HTN - controlled. No change to medication needed.      Dyslipidemia - high triglycerides and high LDL in 2/2019. Statins have caused myalgias in the past. Discussed vascepa. "   She is tolerating vascepa 2 grams BID.   -Lipid panel with next labs.     I spent 25 minutes with the patient. Greater than 50% of the time spent in . Risks/benefits/alternatives reviewed.     Mark Carmona MD on 12/18/2019 at 10:18 AM

## 2019-12-20 NOTE — TELEPHONE ENCOUNTER
[employer] ([phone] Option #3) status check spoke to Mariah and the the noes in their system for charting was started on 12/18/2019 and it is currently under Peer Review.

## 2019-12-24 NOTE — TELEPHONE ENCOUNTER
MEDICAL PRIOR AUTHORIZATION DENIED    Medication: DEXCOM G6 , DEXCOM G6 TRANSMITTER & DEXCOM G6 SENSORS - Denied Under Medical    Denial Date: 12/20/2019    Denial Rational: THERE IS NO CLINICAL DOCUMENTATION THAT INDICATED THAT: THE MEMBER HAS TYPE 1 DIABETES MELLITUS, WHICH IS REQUIRED BY CRITERIA [id number]/L008    Appeal Information: IF THE PROVIDER WOULD LIKE APPEAL THIS DENIAL, PLEASE HAVE THEM PROVIDE A LETTER OF MEDICAL NECESSITY ALONG WITH ANY DOCUMENTATION THAT STATES THERAPIES TRIED/OUTCOMES. ONCE IT HAS BEEN PLACED IN THE PATIENT'S CHART, PLEASE NOTIFY THE PA TEAM ONCE IT HAS BEEN COMPLETED AND WE CAN INITIATE THE APPEAL ON BEHALF OF THE PROVIDER AND PATIENT.

## 2020-02-23 ENCOUNTER — HEALTH MAINTENANCE LETTER (OUTPATIENT)
Age: 65
End: 2020-02-23

## 2020-03-16 DIAGNOSIS — Z79.4 TYPE 2 DIABETES MELLITUS WITHOUT COMPLICATION, WITH LONG-TERM CURRENT USE OF INSULIN (H): ICD-10-CM

## 2020-03-16 DIAGNOSIS — E11.9 TYPE 2 DIABETES MELLITUS WITHOUT COMPLICATION, WITH LONG-TERM CURRENT USE OF INSULIN (H): ICD-10-CM

## 2020-03-17 RX ORDER — SEMAGLUTIDE 1.34 MG/ML
1 INJECTION, SOLUTION SUBCUTANEOUS
Qty: 9 ML | Refills: 1 | Status: SHIPPED | OUTPATIENT
Start: 2020-03-17

## 2020-04-10 ENCOUNTER — TELEPHONE (OUTPATIENT)
Dept: ENDOCRINOLOGY | Facility: CLINIC | Age: 65
End: 2020-04-10

## 2020-04-10 DIAGNOSIS — E11.9 TYPE 2 DIABETES MELLITUS WITHOUT COMPLICATION, WITH LONG-TERM CURRENT USE OF INSULIN (H): ICD-10-CM

## 2020-04-10 DIAGNOSIS — Z79.4 TYPE 2 DIABETES MELLITUS WITHOUT COMPLICATION, WITH LONG-TERM CURRENT USE OF INSULIN (H): ICD-10-CM

## 2020-04-10 NOTE — TELEPHONE ENCOUNTER
LOV:12/18/2019  -Continue the jardiance.   -Walk for 10-15 minutes on the treadmill after meals whenever possible.   -Consider having smaller more frequent meals as well.   -Labs in 3 months.     Patient due for labs- LM for patient to callback to advise of need.     Okay to refill Jardiance or would you like labs first?     Thank you,   Pierce TORO RN   [facility]

## 2020-04-10 NOTE — TELEPHONE ENCOUNTER
Requested Prescriptions   Pending Prescriptions Disp Refills     empagliflozin (JARDIANCE) 10 MG TABS tablet 30 tablet 3     Sig: Take 1 tablet (10 mg) by mouth daily       There is no refill protocol information for this order        Last Written Prescription Date:    Last Fill Quantity: ,  # refills:    Last office visit: 12/18/2019 with prescribing provider:  Tressler   Future Office Visit:

## 2020-04-15 NOTE — TELEPHONE ENCOUNTER
Called patient back. Informed of labs needing to be drawn. Patient currently having insurance issues- will have to pay out of pocket for labs. Will have medicare by August and will have labs drawn at that time    Pierce TORO RN   [facility]

## 2020-06-03 DIAGNOSIS — E11.9 TYPE 2 DIABETES MELLITUS WITHOUT COMPLICATION, WITH LONG-TERM CURRENT USE OF INSULIN (H): ICD-10-CM

## 2020-06-03 DIAGNOSIS — Z79.4 TYPE 2 DIABETES MELLITUS WITHOUT COMPLICATION, WITH LONG-TERM CURRENT USE OF INSULIN (H): ICD-10-CM

## 2020-06-04 RX ORDER — VALSARTAN AND HYDROCHLOROTHIAZIDE 160; 25 MG/1; MG/1
TABLET ORAL
Qty: 90 TABLET | Refills: 1 | Status: SHIPPED | OUTPATIENT
Start: 2020-06-04

## 2020-07-12 DIAGNOSIS — E11.9 TYPE 2 DIABETES MELLITUS WITHOUT COMPLICATION, WITH LONG-TERM CURRENT USE OF INSULIN (H): ICD-10-CM

## 2020-07-12 DIAGNOSIS — Z79.4 TYPE 2 DIABETES MELLITUS WITHOUT COMPLICATION, WITH LONG-TERM CURRENT USE OF INSULIN (H): ICD-10-CM

## 2020-07-14 RX ORDER — INSULIN DEGLUDEC 200 U/ML
INJECTION, SOLUTION SUBCUTANEOUS
Qty: 15 ML | Refills: 3 | Status: SHIPPED | OUTPATIENT
Start: 2020-07-14

## 2020-07-14 NOTE — TELEPHONE ENCOUNTER
Routing refill request to provider for review/approval because:  Labs not current:  A1C    Mila ALMARAZN, RN

## 2020-12-12 ENCOUNTER — HEALTH MAINTENANCE LETTER (OUTPATIENT)
Age: 65
End: 2020-12-12

## 2021-04-11 ENCOUNTER — HEALTH MAINTENANCE LETTER (OUTPATIENT)
Age: 66
End: 2021-04-11

## 2021-08-01 ENCOUNTER — HEALTH MAINTENANCE LETTER (OUTPATIENT)
Age: 66
End: 2021-08-01

## 2021-09-26 ENCOUNTER — HEALTH MAINTENANCE LETTER (OUTPATIENT)
Age: 66
End: 2021-09-26

## 2021-11-21 ENCOUNTER — HEALTH MAINTENANCE LETTER (OUTPATIENT)
Age: 66
End: 2021-11-21

## 2022-01-16 ENCOUNTER — HEALTH MAINTENANCE LETTER (OUTPATIENT)
Age: 67
End: 2022-01-16

## 2022-03-13 ENCOUNTER — HEALTH MAINTENANCE LETTER (OUTPATIENT)
Age: 67
End: 2022-03-13

## 2023-01-08 ENCOUNTER — HEALTH MAINTENANCE LETTER (OUTPATIENT)
Age: 68
End: 2023-01-08

## 2023-04-23 ENCOUNTER — HEALTH MAINTENANCE LETTER (OUTPATIENT)
Age: 68
End: 2023-04-23

## 2023-09-24 ENCOUNTER — HEALTH MAINTENANCE LETTER (OUTPATIENT)
Age: 68
End: 2023-09-24

## 2024-02-11 ENCOUNTER — HEALTH MAINTENANCE LETTER (OUTPATIENT)
Age: 69
End: 2024-02-11

## 2024-06-17 PROBLEM — Z71.89 ADVANCED DIRECTIVES, COUNSELING/DISCUSSION: Status: RESOLVED | Noted: 2019-07-30 | Resolved: 2024-06-17
